# Patient Record
Sex: MALE | Race: WHITE | NOT HISPANIC OR LATINO | Employment: FULL TIME | URBAN - METROPOLITAN AREA
[De-identification: names, ages, dates, MRNs, and addresses within clinical notes are randomized per-mention and may not be internally consistent; named-entity substitution may affect disease eponyms.]

---

## 2017-07-18 ENCOUNTER — ANESTHESIA EVENT (OUTPATIENT)
Dept: GASTROENTEROLOGY | Facility: HOSPITAL | Age: 57
End: 2017-07-18
Payer: COMMERCIAL

## 2017-07-19 ENCOUNTER — HOSPITAL ENCOUNTER (OUTPATIENT)
Facility: HOSPITAL | Age: 57
Setting detail: OUTPATIENT SURGERY
Discharge: HOME | End: 2017-07-19
Attending: COLON & RECTAL SURGERY | Admitting: COLON & RECTAL SURGERY
Payer: COMMERCIAL

## 2017-07-19 ENCOUNTER — ANESTHESIA (OUTPATIENT)
Dept: GASTROENTEROLOGY | Facility: HOSPITAL | Age: 57
End: 2017-07-19
Payer: COMMERCIAL

## 2017-07-19 VITALS
HEIGHT: 74 IN | HEART RATE: 64 BPM | WEIGHT: 210 LBS | OXYGEN SATURATION: 99 % | BODY MASS INDEX: 26.95 KG/M2 | SYSTOLIC BLOOD PRESSURE: 110 MMHG | TEMPERATURE: 97.5 F | DIASTOLIC BLOOD PRESSURE: 83 MMHG | RESPIRATION RATE: 20 BRPM

## 2017-07-19 RX ORDER — PROPOFOL 10 MG/ML
INJECTION, EMULSION INTRAVENOUS AS NEEDED
Status: DISCONTINUED | OUTPATIENT
Start: 2017-07-19 | End: 2017-07-19 | Stop reason: SURG

## 2017-07-19 RX ORDER — SODIUM CHLORIDE 9 MG/ML
125 INJECTION, SOLUTION INTRAVENOUS CONTINUOUS
Status: DISCONTINUED | OUTPATIENT
Start: 2017-07-19 | End: 2017-07-19 | Stop reason: HOSPADM

## 2017-07-19 RX ORDER — SODIUM CHLORIDE, SODIUM LACTATE, POTASSIUM CHLORIDE, CALCIUM CHLORIDE 600; 310; 30; 20 MG/100ML; MG/100ML; MG/100ML; MG/100ML
INJECTION, SOLUTION INTRAVENOUS CONTINUOUS PRN
Status: DISCONTINUED | OUTPATIENT
Start: 2017-07-19 | End: 2017-07-19 | Stop reason: SURG

## 2017-07-19 RX ORDER — OMEGA-3/DHA/EPA/FISH OIL 60 MG-90MG
CAPSULE ORAL DAILY
COMMUNITY
End: 2020-03-17 | Stop reason: SDUPTHER

## 2017-07-19 RX ORDER — DIPHENOXYLATE HYDROCHLORIDE AND ATROPINE SULFATE 2.5; .025 MG/1; MG/1
1 TABLET ORAL DAILY
COMMUNITY
End: 2020-03-17 | Stop reason: SDUPTHER

## 2017-07-19 RX ADMIN — PROPOFOL 100 MG: 10 INJECTION, EMULSION INTRAVENOUS at 07:53

## 2017-07-19 RX ADMIN — SODIUM CHLORIDE 125 ML/HR: 0.9 INJECTION, SOLUTION INTRAVENOUS at 07:12

## 2017-07-19 RX ADMIN — SODIUM CHLORIDE, SODIUM LACTATE, POTASSIUM CHLORIDE, AND CALCIUM CHLORIDE: .6; .31; .03; .02 INJECTION, SOLUTION INTRAVENOUS at 07:42

## 2017-07-19 RX ADMIN — PROPOFOL 50 MG: 10 INJECTION, EMULSION INTRAVENOUS at 08:03

## 2017-07-19 RX ADMIN — PROPOFOL 50 MG: 10 INJECTION, EMULSION INTRAVENOUS at 07:59

## 2017-07-19 RX ADMIN — PROPOFOL 50 MG: 10 INJECTION, EMULSION INTRAVENOUS at 07:57

## 2017-07-19 RX ADMIN — PROPOFOL 50 MG: 10 INJECTION, EMULSION INTRAVENOUS at 07:55

## 2018-01-17 NOTE — RESULT NOTES
Verified Results  (1) CBC/PLT/DIFF [id number] 91:96BM Micha Small     Test Name Result Flag Reference   WHITE BLOOD CELL COUNT 4 7 Thousand/uL  3 8-10 8   RED BLOOD CELL COUNT 4 78 Million/uL  4 20-5 80   HEMOGLOBIN 14 2 g/dL  13 2-17 1   HEMATOCRIT 43 1 %  38 5-50 0   MCV 90 2 fL  80 0-100 0   MCH 29 6 pg  27 0-33 0   EOSINOPHILS 3 6 %     BASOPHILS 0 4 %     ABSOLUTE MONOCYTES 324 cells/uL  200-950   ABSOLUTE EOSINOPHILS 169 cells/uL     ABSOLUTE BASOPHILS 19 cells/uL  0-200   NEUTROPHILS 63 3 %     LYMPHOCYTES 25 8 %     MONOCYTES 6 9 %     MCHC 32 9 g/dL  32 0-36 0   RDW 13 7 %  11 0-15 0   PLATELET COUNT 476 Thousand/uL  140-400   MPV 8 1 fL  7 5-11 5   ABSOLUTE NEUTROPHILS 2975 cells/uL  4813-1392   ABSOLUTE LYMPHOCYTES 1213 cells/uL  850-3900     (1) COMPREHENSIVE METABOLIC PANEL [id number] 39:66DD Saul Li     Test Name Result Flag Reference   GLUCOSE 83 mg/dL  65-99   Fasting reference interval   UREA NITROGEN (BUN) 16 mg/dL  7-25   CREATININE 1 33 mg/dL  0 70-1 33   For patients >52years of age, the reference limit  for Creatinine is approximately 13% higher for people  identified as -American  eGFR NON-AFR   AMERICAN 59 mL/min/1 73m2 L > OR = 60   eGFR AFRICAN AMERICAN 68 mL/min/1 73m2  > OR = 60   BUN/CREATININE RATIO   8-00   NOT APPLICABLE (calc)   ALT 23 U/L  9-46   ALBUMIN 4 3 g/dL  3 6-5 1   GLOBULIN 2 6 g/dL (calc)  1 9-3 7   ALBUMIN/GLOBULIN RATIO 1 7 (calc)  1 0-2 5   BILIRUBIN, TOTAL 1 3 mg/dL H 0 2-1 2   ALKALINE PHOSPHATASE 54 U/L     AST 41 U/L H 10-35   SODIUM 136 mmol/L  135-146   POTASSIUM 4 1 mmol/L  3 5-5 3   CHLORIDE 101 mmol/L     CARBON DIOXIDE 26 mmol/L  20-31   CALCIUM 9 3 mg/dL  8 6-10 3   PROTEIN, TOTAL 6 9 g/dL  6 1-8 1     (1) LIPID PANEL, FASTING [id number] 64:89HC Micha Borden     Test Name Result Flag Reference   CHOLESTEROL, TOTAL 204 mg/dL H 125-200   HDL CHOLESTEROL 68 mg/dL  > OR = 40   TRIGLICERIDES 42 mg/dL  <895   LDL-CHOLESTEROL 128 mg/dL (calc)  <130   Desirable range <100 mg/dL for patients with CHD or  diabetes and <70 mg/dL for diabetic patients with  known heart disease  CHOL/HDLC RATIO 3 0 (calc)  < OR = 5 0   NON HDL CHOLESTEROL 136 mg/dL (calc)     Target for non-HDL cholesterol is 30 mg/dL higher than   LDL cholesterol target       (Q) TSH, 3RD GENERATION [id number] 94:64JV Micha Borden   REPORT COMMENT:  FASTING:YES     Test Name Result Flag Reference   TSH 2 72 mIU/L  0 40-4 50

## 2018-01-29 ENCOUNTER — OFFICE VISIT (OUTPATIENT)
Dept: FAMILY MEDICINE CLINIC | Facility: CLINIC | Age: 58
End: 2018-01-29
Payer: COMMERCIAL

## 2018-01-29 VITALS
HEIGHT: 74 IN | TEMPERATURE: 96.9 F | DIASTOLIC BLOOD PRESSURE: 92 MMHG | HEART RATE: 68 BPM | WEIGHT: 215.4 LBS | SYSTOLIC BLOOD PRESSURE: 132 MMHG | RESPIRATION RATE: 16 BRPM | BODY MASS INDEX: 27.64 KG/M2

## 2018-01-29 DIAGNOSIS — K21.9 GASTROESOPHAGEAL REFLUX DISEASE WITHOUT ESOPHAGITIS: ICD-10-CM

## 2018-01-29 DIAGNOSIS — K21.9 GASTROESOPHAGEAL REFLUX DISEASE, ESOPHAGITIS PRESENCE NOT SPECIFIED: Primary | ICD-10-CM

## 2018-01-29 PROCEDURE — 99213 OFFICE O/P EST LOW 20 MIN: CPT | Performed by: FAMILY MEDICINE

## 2018-01-29 RX ORDER — PANTOPRAZOLE SODIUM 40 MG/1
40 TABLET, DELAYED RELEASE ORAL DAILY
Qty: 30 TABLET | Refills: 0 | Status: SHIPPED | OUTPATIENT
Start: 2018-01-29 | End: 2018-01-31

## 2018-01-29 RX ORDER — FLUTICASONE PROPIONATE 50 MCG
2 SPRAY, SUSPENSION (ML) NASAL DAILY
COMMUNITY
Start: 2011-09-22 | End: 2020-03-17 | Stop reason: SDUPTHER

## 2018-01-29 NOTE — ASSESSMENT & PLAN NOTE
Esophageal reflux  Patient was prescribed pantoprazole 40 mg to use once daily  He will avoid any spicy foods or fast foods  The patient was informed that if symptoms change in any way we will consider ordering CT scan of his abdomen for further evaluation    If symptoms worsened significantly today he was instructed to go to nearest emergency room for further evaluation

## 2018-01-29 NOTE — PROGRESS NOTES
FAMILY PRACTICE OFFICE VISIT       NAME: Corrinne Grip  AGE: 61 y o  SEX: male       : 1960        MRN: [id number]    DATE: 2018  TIME: 5:32 PM    Assessment and Plan     Problem List Items Addressed This Visit     Gastroesophageal reflux disease without esophagitis     Esophageal reflux  Patient was prescribed pantoprazole 40 mg to use once daily  He will avoid any spicy foods or fast foods  The patient was informed that if symptoms change in any way we will consider ordering CT scan of his abdomen for further evaluation  If symptoms worsened significantly today he was instructed to go to nearest emergency room for further evaluation         Relevant Medications    pantoprazole (PROTONIX) 40 mg tablet      Other Visit Diagnoses     Gastroesophageal reflux disease, esophagitis presence not specified    -  Primary    Relevant Medications    pantoprazole (PROTONIX) 40 mg tablet            There are no Patient Instructions on file for this visit  Chief Complaint     Chief Complaint   Patient presents with    Abdominal Pain     Patient is here c/o upper abdominal pain and nausea x's 1 day  History of Present Illness     Patient describes vague abdominal symptoms bloating  He denies any constipation nausea or vomiting  He did have some pizza and pulled pork for dinner last night  He denies any recent weight changes  Did try over-the-counter and acid tablets without relief in symptoms  Did take 1 dose of over-the-counter Zantac      Abdominal Pain         Review of Systems   Review of Systems   Constitutional: Negative  Respiratory: Negative  Cardiovascular: Negative  Gastrointestinal:        As per HPI   Genitourinary: Negative  Skin: Negative          Active Problem List     Patient Active Problem List   Diagnosis    Attention deficit disorder    Gastroesophageal reflux disease without esophagitis       Past Medical History:  No past medical history on file     Past Surgical History:  Past Surgical History:   Procedure Laterality Date    APPENDECTOMY      KNEE ARTHROSCOPY      AK COLONOSCOPY FLX DX W/COLLJ SPEC WHEN PFRMD N/A 7/19/2017    Procedure: COLONOSCOPY;  Surgeon: Oneida Leach MD;  Location: [facility]; Service: Colorectal    ROTATOR CUFF REPAIR         Family History:  No family history on file  Social History:  Social History     Social History    Marital status: /Civil Union     Spouse name: N/A    Number of children: N/A    Years of education: N/A     Occupational History    Not on file  Social History Main Topics    Smoking status: Never Smoker    Smokeless tobacco: Never Used    Alcohol use Yes    Drug use: No    Sexual activity: Not on file     Other Topics Concern    Not on file     Social History Narrative    No narrative on file       Objective     Vitals:    01/29/18 1433   BP: 132/92   Pulse: 68   Resp: 16   Temp: (!) 96 9 °F (36 1 °C)     Wt Readings from Last 3 Encounters:   01/29/18 97 7 kg (215 lb 6 4 oz)   07/19/17 95 3 kg (210 lb)   12/08/16 98 5 kg (217 lb 4 oz)       Physical Exam   Constitutional: He appears well-developed and well-nourished  Cardiovascular: Normal rate, regular rhythm and normal heart sounds  Pulmonary/Chest: Effort normal and breath sounds normal    Abdominal: Soft  Bowel sounds are normal  He exhibits no distension and no mass  There is no tenderness  There is no rebound and no guarding  Skin: Skin is warm and dry  No rash noted         Pertinent Laboratory/Diagnostic Studies:  No results found for: GLUCOSE, BUN, CREATININE, CALCIUM, NA, K, CO2, CL  No results found for: ALT, AST, GGT, ALKPHOS, BILITOT    No results found for: WBC, HGB, HCT, MCV, PLT    No results found for: TSH    No results found for: CHOL  No results found for: TRIG  No results found for: HDL  No results found for: LDLCALC  No results found for: HGBA1C    Results for orders placed or performed in visit on 12/08/16   POCT urinalysis dipstick (Historical)   Result Value Ref Range    Color, UA Yellow     Clarity, UA Transparent     Leukocytes, UA neg     Nitrite, UA neg     Blood, UA neg     Bilirubin, UA neg     Urobilinogen, UA neg     Protein, UA 15     pH, UA 5 0     Specific [place], UA 1 015     Ketones, UA neg     Glucose neggg        No orders of the defined types were placed in this encounter  ALLERGIES:  No Known Allergies    Current Medications     Current Outpatient Prescriptions   Medication Sig Dispense Refill    fluticasone (FLONASE) 50 mcg/act nasal spray 2 sprays into each nostril daily      multivitamin (THERAGRAN) TABS Take 1 tablet by mouth daily      Omega-3 Fatty Acids (FISH OIL) 500 MG CAPS Take by mouth daily      co-enzyme Q-10 30 MG capsule Take 30 mg by mouth 3 (three) times a day      pantoprazole (PROTONIX) 40 mg tablet Take 1 tablet (40 mg total) by mouth daily 30 tablet 0     No current facility-administered medications for this visit  Health Maintenance   There are no preventive care reminders to display for this patient    Immunization History   Administered Date(s) Administered    Influenza TIV (IM) 66/28/0107       Arsen Wray MD

## 2018-01-31 ENCOUNTER — APPOINTMENT (OUTPATIENT)
Dept: RADIOLOGY | Facility: HOSPITAL | Age: 58
End: 2018-01-31
Payer: COMMERCIAL

## 2018-01-31 ENCOUNTER — HOSPITAL ENCOUNTER (OUTPATIENT)
Facility: HOSPITAL | Age: 58
Setting detail: OBSERVATION
Discharge: HOME | End: 2018-02-01
Attending: EMERGENCY MEDICINE | Admitting: SURGERY
Payer: COMMERCIAL

## 2018-01-31 ENCOUNTER — ANESTHESIA (OUTPATIENT)
Dept: PERIOP | Facility: HOSPITAL | Age: 58
End: 2018-01-31
Payer: COMMERCIAL

## 2018-01-31 ENCOUNTER — ANESTHESIA EVENT (OUTPATIENT)
Dept: PERIOP | Facility: HOSPITAL | Age: 58
End: 2018-01-31
Payer: COMMERCIAL

## 2018-01-31 ENCOUNTER — APPOINTMENT (EMERGENCY)
Dept: RADIOLOGY | Facility: HOSPITAL | Age: 58
End: 2018-01-31
Payer: COMMERCIAL

## 2018-01-31 DIAGNOSIS — K80.20 CHOLELITHIASIS: Primary | ICD-10-CM

## 2018-01-31 DIAGNOSIS — K81.0 ACUTE CHOLECYSTITIS: ICD-10-CM

## 2018-01-31 DIAGNOSIS — K81.9 CHOLECYSTITIS: ICD-10-CM

## 2018-01-31 LAB
ABO GROUP BLD: NORMAL
ALBUMIN SERPL BCP-MCNC: 4.3 G/DL (ref 3.5–5)
ALP SERPL-CCNC: 53 U/L (ref 46–116)
ALT SERPL W P-5'-P-CCNC: 23 U/L (ref 12–78)
ANION GAP SERPL CALCULATED.3IONS-SCNC: 6 MMOL/L (ref 4–13)
AST SERPL W P-5'-P-CCNC: 24 U/L (ref 5–45)
ATRIAL RATE: 49 BPM
BASOPHILS # BLD AUTO: 0.01 THOUSANDS/ΜL (ref 0–0.1)
BASOPHILS NFR BLD AUTO: 0 % (ref 0–1)
BILIRUB SERPL-MCNC: 1.29 MG/DL (ref 0.2–1)
BILIRUB UR QL STRIP: NEGATIVE
BLD GP AB SCN SERPL QL: NEGATIVE
BUN SERPL-MCNC: 20 MG/DL (ref 5–25)
CALCIUM SERPL-MCNC: 9.4 MG/DL (ref 8.3–10.1)
CHLORIDE SERPL-SCNC: 99 MMOL/L (ref 100–108)
CK MB SERPL-MCNC: 1.3 % (ref 0–2.5)
CK MB SERPL-MCNC: 4.3 NG/ML (ref 0–5)
CK SERPL-CCNC: 329 U/L (ref 39–308)
CLARITY UR: CLEAR
CO2 SERPL-SCNC: 30 MMOL/L (ref 21–32)
COLOR UR: YELLOW
COLOR, POC: YELLOW
CREAT SERPL-MCNC: 1.31 MG/DL (ref 0.6–1.3)
EOSINOPHIL # BLD AUTO: 0.17 THOUSAND/ΜL (ref 0–0.61)
EOSINOPHIL NFR BLD AUTO: 2 % (ref 0–6)
ERYTHROCYTE [DISTWIDTH] IN BLOOD BY AUTOMATED COUNT: 13.4 % (ref 11.6–15.1)
GFR SERPL CREATININE-BSD FRML MDRD: 60 ML/MIN/1.73SQ M
GLUCOSE SERPL-MCNC: 102 MG/DL (ref 65–140)
GLUCOSE UR STRIP-MCNC: NEGATIVE MG/DL
HCT VFR BLD AUTO: 39.8 % (ref 36.5–49.3)
HGB BLD-MCNC: 14.3 G/DL (ref 12–17)
HGB UR QL STRIP.AUTO: NEGATIVE
KETONES UR STRIP-MCNC: NEGATIVE MG/DL
LEUKOCYTE ESTERASE UR QL STRIP: NEGATIVE
LIPASE SERPL-CCNC: 164 U/L (ref 73–393)
LYMPHOCYTES # BLD AUTO: 1.04 THOUSANDS/ΜL (ref 0.6–4.47)
LYMPHOCYTES NFR BLD AUTO: 11 % (ref 14–44)
MCH RBC QN AUTO: 31.5 PG (ref 26.8–34.3)
MCHC RBC AUTO-ENTMCNC: 35.9 G/DL (ref 31.4–37.4)
MCV RBC AUTO: 88 FL (ref 82–98)
MONOCYTES # BLD AUTO: 0.69 THOUSAND/ΜL (ref 0.17–1.22)
MONOCYTES NFR BLD AUTO: 7 % (ref 4–12)
NEUTROPHILS # BLD AUTO: 7.37 THOUSANDS/ΜL (ref 1.85–7.62)
NEUTS SEG NFR BLD AUTO: 80 % (ref 43–75)
NITRITE UR QL STRIP: NEGATIVE
NRBC BLD AUTO-RTO: 0 /100 WBCS
P AXIS: 62 DEGREES
PH UR STRIP.AUTO: 6 [PH] (ref 4.5–8)
PLATELET # BLD AUTO: 159 THOUSANDS/UL (ref 149–390)
PLATELET # BLD AUTO: 163 THOUSANDS/UL (ref 149–390)
PMV BLD AUTO: 9.4 FL (ref 8.9–12.7)
PMV BLD AUTO: 9.7 FL (ref 8.9–12.7)
POTASSIUM SERPL-SCNC: 3.8 MMOL/L (ref 3.5–5.3)
PR INTERVAL: 180 MS
PROT SERPL-MCNC: 7.7 G/DL (ref 6.4–8.2)
PROT UR STRIP-MCNC: NEGATIVE MG/DL
QRS AXIS: 17 DEGREES
QRSD INTERVAL: 100 MS
QT INTERVAL: 468 MS
QTC INTERVAL: 422 MS
RBC # BLD AUTO: 4.54 MILLION/UL (ref 3.88–5.62)
RH BLD: POSITIVE
SODIUM SERPL-SCNC: 135 MMOL/L (ref 136–145)
SP GR UR STRIP.AUTO: 1.01 (ref 1–1.03)
SPECIMEN EXPIRATION DATE: NORMAL
T WAVE AXIS: 35 DEGREES
TROPONIN I SERPL-MCNC: <0.02 NG/ML
UROBILINOGEN UR QL STRIP.AUTO: 0.2 E.U./DL
VENTRICULAR RATE: 49 BPM
WBC # BLD AUTO: 9.3 THOUSAND/UL (ref 4.31–10.16)

## 2018-01-31 PROCEDURE — 74300 X-RAY BILE DUCTS/PANCREAS: CPT

## 2018-01-31 PROCEDURE — 74177 CT ABD & PELVIS W/CONTRAST: CPT

## 2018-01-31 PROCEDURE — 83690 ASSAY OF LIPASE: CPT | Performed by: EMERGENCY MEDICINE

## 2018-01-31 PROCEDURE — 99225 PR SBSQ OBSERVATION CARE/DAY 25 MINUTES: CPT | Performed by: SURGERY

## 2018-01-31 PROCEDURE — 96374 THER/PROPH/DIAG INJ IV PUSH: CPT

## 2018-01-31 PROCEDURE — 84484 ASSAY OF TROPONIN QUANT: CPT | Performed by: EMERGENCY MEDICINE

## 2018-01-31 PROCEDURE — 81003 URINALYSIS AUTO W/O SCOPE: CPT

## 2018-01-31 PROCEDURE — 85049 AUTOMATED PLATELET COUNT: CPT | Performed by: SURGERY

## 2018-01-31 PROCEDURE — 93005 ELECTROCARDIOGRAM TRACING: CPT

## 2018-01-31 PROCEDURE — 86900 BLOOD TYPING SEROLOGIC ABO: CPT | Performed by: SURGERY

## 2018-01-31 PROCEDURE — 47562 LAPAROSCOPIC CHOLECYSTECTOMY: CPT | Performed by: SURGERY

## 2018-01-31 PROCEDURE — 86850 RBC ANTIBODY SCREEN: CPT | Performed by: SURGERY

## 2018-01-31 PROCEDURE — 88304 TISSUE EXAM BY PATHOLOGIST: CPT | Performed by: PATHOLOGY

## 2018-01-31 PROCEDURE — 96361 HYDRATE IV INFUSION ADD-ON: CPT

## 2018-01-31 PROCEDURE — 81002 URINALYSIS NONAUTO W/O SCOPE: CPT | Performed by: EMERGENCY MEDICINE

## 2018-01-31 PROCEDURE — 82553 CREATINE MB FRACTION: CPT | Performed by: EMERGENCY MEDICINE

## 2018-01-31 PROCEDURE — 99285 EMERGENCY DEPT VISIT HI MDM: CPT

## 2018-01-31 PROCEDURE — 36415 COLL VENOUS BLD VENIPUNCTURE: CPT | Performed by: EMERGENCY MEDICINE

## 2018-01-31 PROCEDURE — 82550 ASSAY OF CK (CPK): CPT | Performed by: EMERGENCY MEDICINE

## 2018-01-31 PROCEDURE — 76705 ECHO EXAM OF ABDOMEN: CPT

## 2018-01-31 PROCEDURE — 88304 TISSUE EXAM BY PATHOLOGIST: CPT | Performed by: SURGERY

## 2018-01-31 PROCEDURE — 93010 ELECTROCARDIOGRAM REPORT: CPT | Performed by: INTERNAL MEDICINE

## 2018-01-31 PROCEDURE — 80053 COMPREHEN METABOLIC PANEL: CPT | Performed by: EMERGENCY MEDICINE

## 2018-01-31 PROCEDURE — 96375 TX/PRO/DX INJ NEW DRUG ADDON: CPT

## 2018-01-31 PROCEDURE — 85025 COMPLETE CBC W/AUTO DIFF WBC: CPT | Performed by: EMERGENCY MEDICINE

## 2018-01-31 PROCEDURE — 86901 BLOOD TYPING SEROLOGIC RH(D): CPT | Performed by: SURGERY

## 2018-01-31 RX ORDER — FENTANYL CITRATE 50 UG/ML
INJECTION, SOLUTION INTRAMUSCULAR; INTRAVENOUS AS NEEDED
Status: DISCONTINUED | OUTPATIENT
Start: 2018-01-31 | End: 2018-01-31 | Stop reason: SURG

## 2018-01-31 RX ORDER — OXYCODONE HYDROCHLORIDE 10 MG/1
10 TABLET ORAL EVERY 4 HOURS PRN
Status: DISCONTINUED | OUTPATIENT
Start: 2018-01-31 | End: 2018-02-01 | Stop reason: HOSPADM

## 2018-01-31 RX ORDER — MAGNESIUM HYDROXIDE 1200 MG/15ML
LIQUID ORAL AS NEEDED
Status: DISCONTINUED | OUTPATIENT
Start: 2018-01-31 | End: 2018-01-31 | Stop reason: HOSPADM

## 2018-01-31 RX ORDER — EPHEDRINE SULFATE 50 MG/ML
INJECTION, SOLUTION INTRAVENOUS AS NEEDED
Status: DISCONTINUED | OUTPATIENT
Start: 2018-01-31 | End: 2018-01-31 | Stop reason: SURG

## 2018-01-31 RX ORDER — FENTANYL CITRATE 50 UG/ML
50 INJECTION, SOLUTION INTRAMUSCULAR; INTRAVENOUS ONCE
Status: COMPLETED | OUTPATIENT
Start: 2018-01-31 | End: 2018-01-31

## 2018-01-31 RX ORDER — LIDOCAINE HYDROCHLORIDE 10 MG/ML
INJECTION, SOLUTION INFILTRATION; PERINEURAL AS NEEDED
Status: DISCONTINUED | OUTPATIENT
Start: 2018-01-31 | End: 2018-01-31 | Stop reason: SURG

## 2018-01-31 RX ORDER — SUCRALFATE ORAL 1 G/10ML
1000 SUSPENSION ORAL ONCE
Status: COMPLETED | OUTPATIENT
Start: 2018-01-31 | End: 2018-01-31

## 2018-01-31 RX ORDER — CEFAZOLIN SODIUM 1 G/3ML
INJECTION, POWDER, FOR SOLUTION INTRAMUSCULAR; INTRAVENOUS AS NEEDED
Status: DISCONTINUED | OUTPATIENT
Start: 2018-01-31 | End: 2018-01-31 | Stop reason: SURG

## 2018-01-31 RX ORDER — OXYCODONE HYDROCHLORIDE 5 MG/1
5 TABLET ORAL EVERY 4 HOURS PRN
Status: DISCONTINUED | OUTPATIENT
Start: 2018-01-31 | End: 2018-02-01 | Stop reason: HOSPADM

## 2018-01-31 RX ORDER — PROPOFOL 10 MG/ML
INJECTION, EMULSION INTRAVENOUS AS NEEDED
Status: DISCONTINUED | OUTPATIENT
Start: 2018-01-31 | End: 2018-01-31 | Stop reason: SURG

## 2018-01-31 RX ORDER — SODIUM CHLORIDE, SODIUM LACTATE, POTASSIUM CHLORIDE, CALCIUM CHLORIDE 600; 310; 30; 20 MG/100ML; MG/100ML; MG/100ML; MG/100ML
100 INJECTION, SOLUTION INTRAVENOUS CONTINUOUS
Status: DISCONTINUED | OUTPATIENT
Start: 2018-01-31 | End: 2018-02-01 | Stop reason: HOSPADM

## 2018-01-31 RX ORDER — HYDROMORPHONE HYDROCHLORIDE 2 MG/ML
INJECTION, SOLUTION INTRAMUSCULAR; INTRAVENOUS; SUBCUTANEOUS AS NEEDED
Status: DISCONTINUED | OUTPATIENT
Start: 2018-01-31 | End: 2018-01-31 | Stop reason: SURG

## 2018-01-31 RX ORDER — ACETAMINOPHEN 325 MG/1
650 TABLET ORAL EVERY 6 HOURS PRN
Status: DISCONTINUED | OUTPATIENT
Start: 2018-01-31 | End: 2018-02-01 | Stop reason: HOSPADM

## 2018-01-31 RX ORDER — HEPARIN SODIUM 5000 [USP'U]/ML
5000 INJECTION, SOLUTION INTRAVENOUS; SUBCUTANEOUS EVERY 8 HOURS SCHEDULED
Status: DISCONTINUED | OUTPATIENT
Start: 2018-01-31 | End: 2018-02-01 | Stop reason: HOSPADM

## 2018-01-31 RX ORDER — GLYCOPYRROLATE 0.2 MG/ML
INJECTION INTRAMUSCULAR; INTRAVENOUS AS NEEDED
Status: DISCONTINUED | OUTPATIENT
Start: 2018-01-31 | End: 2018-01-31 | Stop reason: SURG

## 2018-01-31 RX ORDER — SODIUM CHLORIDE 9 MG/ML
125 INJECTION, SOLUTION INTRAVENOUS CONTINUOUS
Status: DISCONTINUED | OUTPATIENT
Start: 2018-01-31 | End: 2018-02-01 | Stop reason: HOSPADM

## 2018-01-31 RX ORDER — PROMETHAZINE HYDROCHLORIDE 25 MG/ML
25 INJECTION, SOLUTION INTRAMUSCULAR; INTRAVENOUS ONCE AS NEEDED
Status: DISCONTINUED | OUTPATIENT
Start: 2018-01-31 | End: 2018-01-31 | Stop reason: HOSPADM

## 2018-01-31 RX ORDER — FENTANYL CITRATE/PF 50 MCG/ML
50 SYRINGE (ML) INJECTION
Status: DISCONTINUED | OUTPATIENT
Start: 2018-01-31 | End: 2018-01-31 | Stop reason: HOSPADM

## 2018-01-31 RX ORDER — MIDAZOLAM HYDROCHLORIDE 1 MG/ML
INJECTION INTRAMUSCULAR; INTRAVENOUS
Status: COMPLETED
Start: 2018-01-31 | End: 2018-01-31

## 2018-01-31 RX ORDER — ROCURONIUM BROMIDE 10 MG/ML
INJECTION, SOLUTION INTRAVENOUS AS NEEDED
Status: DISCONTINUED | OUTPATIENT
Start: 2018-01-31 | End: 2018-01-31 | Stop reason: SURG

## 2018-01-31 RX ORDER — MAGNESIUM HYDROXIDE/ALUMINUM HYDROXICE/SIMETHICONE 120; 1200; 1200 MG/30ML; MG/30ML; MG/30ML
30 SUSPENSION ORAL ONCE
Status: COMPLETED | OUTPATIENT
Start: 2018-01-31 | End: 2018-01-31

## 2018-01-31 RX ORDER — MIDAZOLAM HYDROCHLORIDE 1 MG/ML
INJECTION INTRAMUSCULAR; INTRAVENOUS AS NEEDED
Status: DISCONTINUED | OUTPATIENT
Start: 2018-01-31 | End: 2018-01-31 | Stop reason: SURG

## 2018-01-31 RX ORDER — ONDANSETRON 2 MG/ML
INJECTION INTRAMUSCULAR; INTRAVENOUS AS NEEDED
Status: DISCONTINUED | OUTPATIENT
Start: 2018-01-31 | End: 2018-01-31 | Stop reason: SURG

## 2018-01-31 RX ADMIN — SODIUM CHLORIDE 1000 ML: 0.9 INJECTION, SOLUTION INTRAVENOUS at 02:05

## 2018-01-31 RX ADMIN — METRONIDAZOLE 500 MG: 500 INJECTION, SOLUTION INTRAVENOUS at 06:06

## 2018-01-31 RX ADMIN — PROPOFOL 200 MG: 10 INJECTION, EMULSION INTRAVENOUS at 14:58

## 2018-01-31 RX ADMIN — HYDROMORPHONE HYDROCHLORIDE 1 MG: 1 INJECTION, SOLUTION INTRAMUSCULAR; INTRAVENOUS; SUBCUTANEOUS at 10:21

## 2018-01-31 RX ADMIN — ROCURONIUM BROMIDE 10 MG: 10 INJECTION INTRAVENOUS at 16:52

## 2018-01-31 RX ADMIN — METRONIDAZOLE 500 MG: 500 INJECTION, SOLUTION INTRAVENOUS at 15:11

## 2018-01-31 RX ADMIN — NEOSTIGMINE METHYLSULFATE 5 MG: 1 INJECTION, SOLUTION INTRAMUSCULAR; INTRAVENOUS; SUBCUTANEOUS at 17:41

## 2018-01-31 RX ADMIN — ROCURONIUM BROMIDE 20 MG: 10 INJECTION INTRAVENOUS at 15:58

## 2018-01-31 RX ADMIN — EPHEDRINE SULFATE 10 MG: 50 INJECTION, SOLUTION INTRAMUSCULAR; INTRAVENOUS; SUBCUTANEOUS at 15:15

## 2018-01-31 RX ADMIN — HYDROMORPHONE HYDROCHLORIDE 0.5 MG: 1 INJECTION, SOLUTION INTRAMUSCULAR; INTRAVENOUS; SUBCUTANEOUS at 06:05

## 2018-01-31 RX ADMIN — OXYCODONE HYDROCHLORIDE 10 MG: 10 TABLET ORAL at 22:45

## 2018-01-31 RX ADMIN — HYDROMORPHONE HYDROCHLORIDE 0.5 MG: 2 INJECTION, SOLUTION INTRAMUSCULAR; INTRAVENOUS; SUBCUTANEOUS at 17:54

## 2018-01-31 RX ADMIN — LIDOCAINE HYDROCHLORIDE 50 MG: 10 INJECTION, SOLUTION INFILTRATION; PERINEURAL at 14:58

## 2018-01-31 RX ADMIN — SODIUM CHLORIDE 1000 ML: 0.9 INJECTION, SOLUTION INTRAVENOUS at 04:51

## 2018-01-31 RX ADMIN — EPHEDRINE SULFATE 5 MG: 50 INJECTION, SOLUTION INTRAMUSCULAR; INTRAVENOUS; SUBCUTANEOUS at 15:09

## 2018-01-31 RX ADMIN — ROCURONIUM BROMIDE 40 MG: 10 INJECTION INTRAVENOUS at 14:58

## 2018-01-31 RX ADMIN — CEFAZOLIN 2000 MG: 1 INJECTION, POWDER, FOR SOLUTION INTRAVENOUS at 15:15

## 2018-01-31 RX ADMIN — ROCURONIUM BROMIDE 10 MG: 10 INJECTION INTRAVENOUS at 15:07

## 2018-01-31 RX ADMIN — HYDROMORPHONE HYDROCHLORIDE 0.5 MG: 1 INJECTION, SOLUTION INTRAMUSCULAR; INTRAVENOUS; SUBCUTANEOUS at 07:44

## 2018-01-31 RX ADMIN — SODIUM CHLORIDE 125 ML/HR: 0.9 INJECTION, SOLUTION INTRAVENOUS at 18:38

## 2018-01-31 RX ADMIN — LIDOCAINE HYDROCHLORIDE 15 ML: 20 SOLUTION ORAL; TOPICAL at 01:28

## 2018-01-31 RX ADMIN — GLYCOPYRROLATE 0.6 MG: 0.2 INJECTION, SOLUTION INTRAMUSCULAR; INTRAVENOUS at 17:41

## 2018-01-31 RX ADMIN — EPHEDRINE SULFATE 10 MG: 50 INJECTION, SOLUTION INTRAMUSCULAR; INTRAVENOUS; SUBCUTANEOUS at 15:12

## 2018-01-31 RX ADMIN — MIDAZOLAM HYDROCHLORIDE 2 MG: 1 INJECTION, SOLUTION INTRAMUSCULAR; INTRAVENOUS at 14:48

## 2018-01-31 RX ADMIN — ONDANSETRON 4 MG: 2 INJECTION INTRAMUSCULAR; INTRAVENOUS at 17:11

## 2018-01-31 RX ADMIN — HYDROMORPHONE HYDROCHLORIDE 0.5 MG: 1 INJECTION, SOLUTION INTRAMUSCULAR; INTRAVENOUS; SUBCUTANEOUS at 04:13

## 2018-01-31 RX ADMIN — HYDROMORPHONE HYDROCHLORIDE 1 MG: 1 INJECTION, SOLUTION INTRAMUSCULAR; INTRAVENOUS; SUBCUTANEOUS at 20:00

## 2018-01-31 RX ADMIN — CEFAZOLIN SODIUM 2000 MG: 2 SOLUTION INTRAVENOUS at 07:36

## 2018-01-31 RX ADMIN — SODIUM CHLORIDE 125 ML/HR: 0.9 INJECTION, SOLUTION INTRAVENOUS at 07:36

## 2018-01-31 RX ADMIN — HYDROMORPHONE HYDROCHLORIDE 0.5 MG: 2 INJECTION, SOLUTION INTRAMUSCULAR; INTRAVENOUS; SUBCUTANEOUS at 17:48

## 2018-01-31 RX ADMIN — IOHEXOL 100 ML: 350 INJECTION, SOLUTION INTRAVENOUS at 03:40

## 2018-01-31 RX ADMIN — HEPARIN SODIUM 5000 UNITS: 5000 INJECTION, SOLUTION INTRAVENOUS; SUBCUTANEOUS at 21:07

## 2018-01-31 RX ADMIN — DEXAMETHASONE SODIUM PHOSPHATE 10 MG: 10 INJECTION INTRAMUSCULAR; INTRAVENOUS at 15:16

## 2018-01-31 RX ADMIN — FENTANYL CITRATE 50 MCG: 50 INJECTION, SOLUTION INTRAMUSCULAR; INTRAVENOUS at 14:58

## 2018-01-31 RX ADMIN — METRONIDAZOLE 500 MG: 500 INJECTION, SOLUTION INTRAVENOUS at 14:09

## 2018-01-31 RX ADMIN — ONDANSETRON 4 MG: 2 INJECTION INTRAMUSCULAR; INTRAVENOUS at 15:14

## 2018-01-31 RX ADMIN — SUCRALFATE 1000 MG: 1 SUSPENSION ORAL at 01:28

## 2018-01-31 RX ADMIN — SODIUM CHLORIDE: 0.9 INJECTION, SOLUTION INTRAVENOUS at 15:06

## 2018-01-31 RX ADMIN — ALUMINUM HYDROXIDE, MAGNESIUM HYDROXIDE, AND SIMETHICONE 30 ML: 200; 200; 20 SUSPENSION ORAL at 01:28

## 2018-01-31 RX ADMIN — FENTANYL CITRATE 50 MCG: 50 INJECTION INTRAMUSCULAR; INTRAVENOUS at 02:05

## 2018-01-31 RX ADMIN — HYDROMORPHONE HYDROCHLORIDE 1 MG: 1 INJECTION, SOLUTION INTRAMUSCULAR; INTRAVENOUS; SUBCUTANEOUS at 12:12

## 2018-01-31 RX ADMIN — GLYCOPYRROLATE 0.1 MG: 0.2 INJECTION, SOLUTION INTRAMUSCULAR; INTRAVENOUS at 15:14

## 2018-01-31 RX ADMIN — ROCURONIUM BROMIDE 20 MG: 10 INJECTION INTRAVENOUS at 16:30

## 2018-01-31 NOTE — ED NOTES
Dr Steven King at the bedside for patient evaluation at this time       Jen Reynoso, RN  01/31/18 2804

## 2018-01-31 NOTE — ANESTHESIA PREPROCEDURE EVALUATION
Review of Systems/Medical History  Patient summary reviewed  Chart reviewed  No history of anesthetic complications     Cardiovascular  EKG reviewed, Exercise tolerance: good,     Pulmonary       GI/Hepatic    GERD well controlled,             Endo/Other     GYN       Hematology   Musculoskeletal       Neurology   Psychology           Physical Exam    Airway    Mallampati score: II  TM Distance: >3 FB  Neck ROM: full     Dental   No notable dental hx     Cardiovascular      Pulmonary      Other Findings        Anesthesia Plan  ASA Score- 2 Emergent    Anesthesia Type- general with ASA Monitors  Additional Monitors:   Airway Plan: ETT  Plan Factors-    Induction- intravenous  Postoperative Plan- Plan for postoperative opioid use  Informed Consent- Anesthetic plan and risks discussed with patient  I personally reviewed this patient with the CRNA  Discussed and agreed on the Anesthesia Plan with the CRNA  Dara Soulier

## 2018-01-31 NOTE — ED PROVIDER NOTES
History  Chief Complaint   Patient presents with    Abdominal Pain     pt states abdominal pain since   denies NVD or trouble urinating  HPI   60 yo male presenting for evaluation of abdominal pain  Started Monday morning, woke patient up  States it was in the upper abdomen, feels like a muscle cramp  Has had this type of pain when he had appendicitis  He took some TUMS, but it didn't help, tried rolaid and zantac, which didn't help  Took Aleve and it helped, fell back to sleep  Woke up Monday, still had pain so went to PCP, was given prescription for PPI, took the medications today  Today was doing ok, had lunch (vegetable omelett with crowley) and the pain started  Patient then had dinner, chicken and BBQ sauce, and the pain got worse  Patient states that his rib cage feels cramped up  Patient had a very intense crossfit work out today, and states he has worsened pain  Pain is better when he moves around, when he lifts his arms above his head, the pain is better  Patient denies any nausea, no vomiting, no diarrhea  Denies any urinary symptoms, no dysuria  Denies fevers and chills  No recent travel, no sick contacts, no recent illness  PMH: none, takes dietary supplements, takes aleve occasionally  PSH: Appendix, shoulder and knee  FH: no FH of biliary disease      Prior to Admission Medications   Prescriptions Last Dose Informant Patient Reported? Taking? Omega-3 Fatty Acids (FISH OIL) 500 MG CAPS 2018 at Unknown time  Yes Yes   Sig: Take by mouth daily   co-enzyme Q-10 30 MG capsule 2018 at Unknown time  Yes Yes   Sig: Take 30 mg by mouth 3 (three) times a day   fluticasone (FLONASE) 50 mcg/act nasal spray 2018 at Unknown time  Yes Yes   Si sprays into each nostril daily   multivitamin (THERAGRAN) TABS 2018 at Unknown time  Yes Yes   Sig: Take 1 tablet by mouth daily      Facility-Administered Medications: None       History reviewed   No pertinent past medical history  Past Surgical History:   Procedure Laterality Date    APPENDECTOMY      KNEE ARTHROSCOPY      [place] COLONOSCOPY FLX DX W/COLLJ SPEC WHEN PFRMD N/A 7/19/2017    Procedure: COLONOSCOPY;  Surgeon: Lewis Cisneros MD;  Location[facility]; Service: Colorectal    ROTATOR CUFF REPAIR         No family history on file  I have reviewed and agree with the history as documented  Social History   Substance Use Topics    Smoking status: Never Smoker    Smokeless tobacco: Never Used    Alcohol use Yes      Comment: socially        Review of Systems    Constitutional: Negative for appetite change, chills and fever  HENT: Negative for congestion, rhinorrhea and sore throat  Eyes: Negative for photophobia, pain and visual disturbance  Respiratory: Negative for cough, chest tightness and shortness of breath  Cardiovascular: Negative for chest pain, palpitations and leg swelling  Gastrointestinal: Positive for abdominal pain, negative for diarrhea, nausea and vomiting  Genitourinary: Negative for dysuria, flank pain and hematuria  Musculoskeletal: Negative for back pain, neck pain and neck stiffness  Skin: Negative for color change, rash and wound  Neurological: Negative for dizziness, numbness and headaches  All other systems reviewed and are negative      Physical Exam  ED Triage Vitals [01/31/18 0042]   Temperature Pulse Respirations Blood Pressure SpO2   98 9 °F (37 2 °C) 60 20 145/79 100 %      Temp Source Heart Rate Source Patient Position - Orthostatic VS BP Location FiO2 (%)   Tympanic Monitor Sitting Left arm --      Pain Score       8           Orthostatic Vital Signs  Vitals:    01/31/18 0445 01/31/18 0515 01/31/18 0545 01/31/18 0605   BP: 124/69 139/62 134/67 123/60   Pulse: 58 60 60 (!) 54   Patient Position - Orthostatic VS: Lying Lying Lying Lying       Physical Exam  /60 (BP Location: Right arm)   Pulse (!) 54   Temp 98 9 °F (37 2 °C) (Tympanic)   Resp 18   Wt 97 5 kg (215 lb)   SpO2 99%   BMI 27 60 kg/m²     General Appearance:  Alert, cooperative, no distress   Head:  Normocephalic, without obvious abnormality, atraumatic   Eyes:  PERRL, conjunctiva/corneas clear, EOM's intact   Ears:  Normal TM's and external ear canals, both ears   Nose: Nares normal, septum midline, mucosa normal, no drainage or sinus tenderness   Throat: Lips, mucosa, and tongue normal; teeth and gums normal   Neck: Supple, symmetrical, trachea midline, no adenopathy   Back:   Symmetric, no curvature, ROM normal, no CVA tenderness   Lungs:   Clear to auscultation bilaterally, respirations unlabored   Chest Wall:  No tenderness or deformity   Heart:  Regular rate and rhythm, S1, S2 normal, no murmur, rub or gallop   Abdomen:   Soft, nondistended, mildly tender in the epigastrium and suprapubic area, no rebound or guarding, bowel sounds active all four quadrants  ,  bedside ultrasound shows mildly distended gallbladder, while appears mildly thick at 0 5 cm  Did not see stones but possible sludge             Extremities: Extremities normal, atraumatic, no cyanosis or edema   Pulses: 2+ and symmetric   Skin: Skin color, texture, turgor normal, no rashes or lesions       Neurologic:      Psychiatric:  Moves all extremities, sensation and strength in tact in all extremities    Normal mood and affect         ED Medications  Medications   ceFAZolin (ANCEF) IVPB (premix) 2,000 mg (not administered)   metroNIDAZOLE (FLAGYL) IVPB (premix) 500 mg (500 mg Intravenous New Bag 1/31/18 0606)   sodium chloride 0 9 % infusion (not administered)   HYDROmorphone (DILAUDID) injection 0 5 mg (0 5 mg Intravenous Given 1/31/18 0605)   aluminum-magnesium hydroxide-simethicone (MYLANTA) 200-200-20 mg/5 mL oral suspension 30 mL (30 mL Oral Given 1/31/18 0128)   sucralfate (CARAFATE) oral suspension 1,000 mg (1,000 mg Oral Given 1/31/18 0128)   lidocaine viscous (XYLOCAINE) 2 % mucosal solution 15 mL (15 mL Swish & Spit Given 1/31/18 0128)   sodium chloride 0 9 % bolus 1,000 mL (0 mL Intravenous Stopped 1/31/18 0305)   fentanyl citrate (PF) 100 MCG/2ML 50 mcg (50 mcg Intravenous Given 1/31/18 0205)   iohexol (OMNIPAQUE) 350 MG/ML injection (SINGLE-DOSE) 100 mL (100 mL Intravenous Given 1/31/18 0340)   HYDROmorphone (DILAUDID) injection 0 5 mg (0 5 mg Intravenous Given 1/31/18 0413)   sodium chloride 0 9 % bolus 1,000 mL (0 mL Intravenous Stopped 1/31/18 0551)       Diagnostic Studies  Results Reviewed     Procedure Component Value Units Date/Time    CKMB [[id number]]  (Normal) Collected:  01/31/18 0130    Lab Status:  Final result Specimen:  Blood from Arm, Left Updated:  01/31/18 0434     CK-MB Index 1 3 %      CK-MB FRACTION 4 3 ng/mL     CK (with reflex to MB) [[id number]]  (Abnormal) Collected:  01/31/18 0130    Lab Status:  Final result Specimen:  Blood from Arm, Left Updated:  01/31/18 0415     Total  (H) U/L     POCT urinalysis dipstick [[id number]]  (Normal) Resulted:  01/31/18 0201    Lab Status:  Final result Specimen:  Urine Updated:  01/31/18 0201     Color, UA yellow    Comprehensive metabolic panel [[id number]]  (Abnormal) Collected:  01/31/18 0130    Lab Status:  Final result Specimen:  Blood from Arm, Left Updated:  01/31/18 0153     Sodium 135 (L) mmol/L      Potassium 3 8 mmol/L      Chloride 99 (L) mmol/L      CO2 30 mmol/L      Anion Gap 6 mmol/L      BUN 20 mg/dL      Creatinine 1 31 (H) mg/dL      Glucose 102 mg/dL      Calcium 9 4 mg/dL      AST 24 U/L      ALT 23 U/L      Alkaline Phosphatase 53 U/L      Total Protein 7 7 g/dL      Albumin 4 3 g/dL      Total Bilirubin 1 29 (H) mg/dL      eGFR 60 ml/min/1 73sq m     Narrative:         National Kidney Disease Education Program recommendations are as follows:  GFR calculation is accurate only with a steady state creatinine  Chronic Kidney disease less than 60 ml/min/1 73 sq  meters  Kidney failure less than 15 ml/min/1 73 sq  meters      Lipase [[id number]]  (Normal) Collected:  01/31/18 0130    Lab Status:  Final result Specimen:  Blood from Arm, Left Updated:  01/31/18 0153     Lipase 164 u/L     Troponin I [[id number]]  (Normal) Collected:  01/31/18 0130    Lab Status:  Final result Specimen:  Blood from Arm, Left Updated:  01/31/18 0153     Troponin I <0 02 ng/mL     Narrative:         [employer] Chemistry analyzer 99% cutoff is > 0 04 ng/mL in network labs    o cTnI 99% cutoff is useful only when applied to patients in the clinical setting of myocardial ischemia  o cTnI 99% cutoff should be interpreted in the context of clinical history, ECG findings and possibly cardiac imaging to establish correct diagnosis  o cTnI 99% cutoff may be suggestive but clearly not indicative of a coronary event without the clinical setting of myocardial ischemia      ED Urine Macroscopic [[id number]]  (Normal) Collected:  01/31/18 0148    Lab Status:  Final result Specimen:  Urine Updated:  01/31/18 0144     Color, UA Yellow     Clarity, UA Clear     pH, UA 6 0     Leukocytes, UA Negative     Nitrite, UA Negative     Protein, UA Negative mg/dl      Glucose, UA Negative mg/dl      Ketones, UA Negative mg/dl      Urobilinogen, UA 0 2 E U /dl      Bilirubin, UA Negative     Blood, UA Negative     Specific Gravity, UA 1 015    Narrative:       CLINITEK RESULT    CBC and differential [[id number]]  (Abnormal) Collected:  01/31/18 0130    Lab Status:  Final result Specimen:  Blood from Arm, Left Updated:  01/31/18 0136     WBC 9 30 Thousand/uL      RBC 4 54 Million/uL      Hemoglobin 14 3 g/dL      Hematocrit 39 8 %      MCV 88 fL      MCH 31 5 pg      MCHC 35 9 g/dL      RDW 13 4 %      MPV 9 4 fL      Platelets 780 Thousands/uL      nRBC 0 /100 WBCs      Neutrophils Relative 80 (H) %      Lymphocytes Relative 11 (L) %      Monocytes Relative 7 %      Eosinophils Relative 2 %      Basophils Relative 0 %      Neutrophils Absolute 7 37 Thousands/µL      Lymphocytes Absolute 1 04 Thousands/µL      Monocytes Absolute 0 69 Thousand/µL      Eosinophils Absolute 0 17 Thousand/µL      Basophils Absolute 0 01 Thousands/µL                  CT abdomen pelvis with contrast    (Results Pending)   US gallbladder    (Results Pending)         Procedures  ECG 12 Lead Documentation  Date/Time: 1/31/2018 1:59 AM  Performed by: Sveta Guaman by: Juventino Garcia     Indications / Diagnosis:  Epigastric pain  ECG reviewed by me, the ED Provider: yes    Patient location:  ED  Previous ECG:     Previous ECG:  Compared to current    Comparison ECG info:  Bradycardic  Interpretation:     Interpretation: non-specific    Rate:     ECG rate:  49    ECG rate assessment: bradycardic    Rhythm:     Rhythm: sinus bradycardia    Ectopy:     Ectopy: none    QRS:     QRS axis:  Normal    QRS intervals:  Normal  Conduction:     Conduction: normal    ST segments:     ST segments:  Normal  T waves:     T waves: normal            Phone Consults  ED Phone Contact    ED Course  ED Course as of Jan 31 0625 Wed Jan 31, 2018   0156 Patient states that the GI cocktail did not help the pain  Patient has increased creatinine and T bili  Will get CT scan of the abdomen pelvis  [facility] with [address] [place]  surgery resident    6616 Vrads: IMPRESSION:  Cholelithiasis and findings most compatible with acute cholecystitis with gallbladder wall  hyperemia/edema/thickening and pericholecystic inflammatory changes  MDM   Patient with abdominal pain, possibly from biliary disease versus gastritis versus pancreatitis versus abdominal strain/muscle strain  Will get CBC, CMP, lipase, check a urine  Will treat with GI cocktail, reassess      CritCare Time    Disposition  Final diagnoses:   Cholelithiasis   Cholecystitis     Time reflects when diagnosis was documented in both MDM as applicable and the Disposition within this note     Time User Action Codes Description Comment    1/31/2018  5:55 AM Matt Manjarrez Add [K80 20] Cholelithiasis     1/31/2018  5:55 AM Tereso Sharif [facility] [K81 9] Cholecystitis       ED Disposition     ED Disposition Condition Comment    Admit  Case was discussed with [facility] and the patient's admission status was agreed to be Admission Status: observation status to the service of Dr Qian Maguire  Follow-up Information    None       Patient's Medications   Discharge Prescriptions    No medications on file     No discharge procedures on file  ED Provider  Attending physically available and evaluated Suzy Hernandez I managed the patient along with the ED Attending      Electronically Signed by         Kalyn Chin MD  01/31/18 6854

## 2018-01-31 NOTE — ANESTHESIA POSTPROCEDURE EVALUATION
Post-Op Assessment Note      CV Status:  Stable    Mental Status:  Alert and awake    Hydration Status:  Stable    PONV Controlled:  None    Airway Patency:  Patent    Post Op Vitals Reviewed: Yes          Staff: CRNA       Comments: Pt  to Pacu  Report given  Course uneventful  VSS througjhout   No c/o pain or PONV           /66 (01/31/18 1755)    Temp 99 1 °F (37 3 °C) (01/31/18 1755)    Pulse 77 (01/31/18 1755)   Resp 16 (01/31/18 1755)    SpO2 100 % (01/31/18 1755)

## 2018-01-31 NOTE — PROGRESS NOTES
General Surgery    Pt seen in pre-op holding by myself  OK to proceed with lap breanna with cholangiogram  Consent signed and placed in chart      Nemo Medrano MD

## 2018-01-31 NOTE — OP NOTE
OPERATIVE REPORT  PATIENT NAME: Kristan Meckel    :  1960  MRN: [id number]  Pt Location:  OR ROOM 05    SURGERY DATE: 2018    Surgeon(s) and Role:     * Julissa Plummer MD - Primary     * Jerzy Allen - Assisting    Preop Diagnosis:  Cholecystitis [K81 9]    Post-Op Diagnosis Codes:     * Cholecystitis [K81 9]    Procedure(s) (LRB):  CHOLECYSTECTOMY LAPAROSCOPIC (N/A)  CHOLANGIOGRAM (N/A)    Specimen(s):  ID Type Source Tests Collected by Time Destination   1 : gallbladder Tissue Gallbladder TISSUE EXAM Julissa Plummer MD 2018 1710        Estimated Blood Loss:   100 mL    Drains:       Anesthesia Type:   General    Operative Indications:  Cholecystitis [K81 9]    Operative Findings:  inflammed gallbladder with thick rind noted     Complications:   None    Procedure and Technique:    The patient was seen again in the Holding Room  The risks, benefits, complications, treatment options, and expected outcomes were discussed with the patient  The possibilities of reaction to  perforation of viscus, bleeding, recurrent infection, finding a normal gallbladder, the need for additional procedures, failure to diagnose a condition, the possible need to convert to an open procedure, and creating a complication requiring transfusion or operation were discussed with the patient  The patient  concurred with the proposed plan, giving informed consent  The site of surgery properly noted/marked  The patient was taken to Operating Room, identified as Kristan Meckel and the procedure verified as Laparoscopic Cholecystectomy with Intraoperative Cholangiograms  A Time Out was held and the above information confirmed  Prior to the induction of general anesthesia, antibiotic prophylaxis was administered  General endotracheal anesthesia was then administered and tolerated well  After the induction, the abdomen was prepped in the usual sterile fashion   The patient was positioned in the supine position with the left arm comfortably tucked, along with some reverse Trendelenburg  Local anesthetic agent was injected into the skin near the umbilicus and an incision made  Pneumoperitoneum was achieved using the verass needle  The midline fascia was incised and a 5  mm port under direct vision  Additional trocars were introduced under direct vision  All skin incisions were infiltrated with a local anesthetic agent before making the incision and placing the trocars  The gallbladder was identified and found to be necrotic and inflammed, the fundus grasped and retracted cephalad  Adhesions were lysed bluntly and with the electrocautery where indicated, taking care not to injure any adjacent organs or viscus  The infundibulum was grasped and retracted laterally, exposing the peritoneum overlying the triangle of Calot  This was then divided and exposed in a blunt fashion  The cystic duct was clearly identified and bluntly dissected circumferentially  The junctions of the gallbladder, cystic duct and common bile duct were clearly identified prior to the division of any linear structure and photo documented  Originally the [employer] clamp was placed on the cystic duct at the infundibulum and a  Cholangiogram was completed  This however demonstrated leakage of dye into the abdomen  Therefore, An incision was made in the cystic duct and the cholangiogram catheter introduced  The catheter was secured using an endoclip  The study showed no stones and good visualization of the distal and proximal biliary tree  There was some tapering noted at the distal CBD, however the small bowel filled appropriately  The catheter was then removed  The cystic duct was then doubly ligated with surgical clips  on the patient side and singly clipped on the gallbladder side and divided  The cystic artery was identified, dissected free, ligated with clips and divided as well       The gallbladder was dissected from the liver bed in retrograde fashion with the electrocautery  The gallbladder was removed  The liver bed was irrigated and inspected  Hemostasis was achieved with the electrocautery  Copious irrigation was utilized and was repeatedly aspirated until clear all particulate matter  Pneumoperitoneum was completely reduced after viewing removal of the trocars under direct vision  The wound was thoroughly irrigated and the fascia was then closed with a figure of eight suture; the skin was then closed with -0 monocryl and a sterile dressing was applied  Instrument, sponge, and needle counts were correct at closure and at the conclusion of the case       Dr Jessica Jones was present fo the [facility] case     Patient Disposition:  PACU     SIGNATURE: Tere Valdivia  DATE: January 31, 2018  TIME: 6:01 PM

## 2018-01-31 NOTE — ED NOTES
Patient requesting pain medication  Will make Dr Radha Castro aware       Ayesha Cole, RN  01/31/18 7423

## 2018-01-31 NOTE — MEDICAL STUDENT
MEDICAL STUDENT NOTE   H&P Exam - General Surgery   Sahara Cam 61 y o  male MRN: [id number]  Unit/Bed#: GYN 1 Encounter: [id number]    Assessment/Plan     Assessment:  61year old male with acute cholecystitis  Plan:  Admit to general surgery  Diet: NPO   Antibiotics: ancef/flagyl  Fluids: 1 L NS bolus for elevated Cr (unknown baseline)  Follow up: RUQ Ultrasound, CT read     History of Present Illness   Esha Casanova is a 61year old male with a history of appendectomy in 2007 who presents for evaluation of abdominal pain  He states that 2 days prior to admission he developed sharp, burning RUQ abdominal pain radiating to his back and epigastrium, with associated nausea  He received Protonix from his family doctor on 1 day prior to admission, which provided some relief  On the night of admission, he states that the pain progressively worsened and woke him up from sleep  He states his pain is relieved by getting up and moving around  He denies any fevers, chills, changes with meals, vomiting, diarrhea, decreased PO intake  Review of Systems   Constitutional: Negative for activity change, appetite change, chills and fever  Cardiovascular: Negative for chest pain, palpitations and leg swelling  Gastrointestinal: Positive for abdominal pain and nausea  Negative for abdominal distention, blood in stool, diarrhea and vomiting  Genitourinary: Negative for dysuria and frequency  Historical Information   History reviewed  No pertinent past medical history  Past Surgical History:   Procedure Laterality Date    APPENDECTOMY      KNEE ARTHROSCOPY      [place] COLONOSCOPY FLX DX W/COLLJ SPEC WHEN PFRMD N/A 7/19/2017    Procedure: COLONOSCOPY;  Surgeon: Peg Jones MD;  Location[facility];   Service: Colorectal    ROTATOR CUFF REPAIR       Social History   History   Alcohol Use    Yes     Comment: socially     History   Drug Use No     History   Smoking Status    Never Smoker   Smokeless Tobacco    Never Used     Family History: non-contributory    Meds/Allergies   all medications and allergies reviewed  No Known Allergies    Objective   First Vitals:   Blood Pressure: 145/79 (01/31/18 0042)  Pulse: 60 (01/31/18 0042)  Temperature: 98 9 °F (37 2 °C) (01/31/18 0042)  Temp Source: Tympanic (01/31/18 0042)  Respirations: 20 (01/31/18 0042)  Weight - Scale: 97 5 kg (215 lb) (01/31/18 0042)  SpO2: 100 % (01/31/18 0042)    Current Vitals:   Blood Pressure: 139/62 (01/31/18 0515)  Pulse: 60 (01/31/18 0515)  Temperature: 98 9 °F (37 2 °C) (01/31/18 0042)  Temp Source: Tympanic (01/31/18 0042)  Respirations: 18 (01/31/18 0515)  Weight - Scale: 97 5 kg (215 lb) (01/31/18 0042)  SpO2: 99 % (01/31/18 0515)      Intake/Output Summary (Last 24 hours) at 01/31/18 0535  Last data filed at 01/31/18 0305   Gross per 24 hour   Intake             1000 ml   Output                0 ml   Net             1000 ml       Invasive Devices     Peripheral Intravenous Line            Peripheral IV 01/31/18 Left Antecubital less than 1 day                Physical Exam   Constitutional: He appears well-developed and well-nourished  No distress  Cardiovascular: Normal rate, regular rhythm, normal heart sounds and intact distal pulses  Exam reveals no gallop and no friction rub  No murmur heard  Pulmonary/Chest: Effort normal and breath sounds normal  No respiratory distress  He has no wheezes  He has no rales  He exhibits no tenderness  Abdominal:   Soft, non-distended, tender in the epigastrium and RUQ  Negative quach sign, no peritoneal signs    Skin: He is not diaphoretic       Lab Results:  Lab Results   Component Value Date    WBC 9 30 01/31/2018    HGB 14 3 01/31/2018    HCT 39 8 01/31/2018    MCV 88 01/31/2018     01/31/2018     Lab Results   Component Value Date    GLUCOSE 102 01/31/2018    CALCIUM 9 4 01/31/2018     (L) 01/31/2018    K 3 8 01/31/2018    CO2 30 01/31/2018    CL 99 (L) 01/31/2018    BUN 20 01/31/2018    CREATININE 1 31 (H) 01/31/2018     Lab Results   Component Value Date    LIPASE 164 01/31/2018     UA - normal   Imaging: CT chest abdomen pelvis pending official read  EKG, Pathology, and Other Studies: I have personally reviewed pertinent reports        Code Status: No Order    Arthur Em   Medical Student

## 2018-01-31 NOTE — ED ATTENDING ATTESTATION
I, [address], DO, saw and evaluated the patient  I have discussed the patient with the resident/non-physician practitioner and agree with the resident's/non-physician practitioner's findings, Plan of Care, and MDM as documented in the resident's/non-physician practitioner's note, except where noted  All available labs and Radiology studies were reviewed  At this point I agree with the current assessment done in the Emergency Department  I have conducted an independent evaluation of this patient a history and physical is as follows:    50-year-old male presents with abdominal pain  Started Monday morning and upper abdomen that felt like a muscle cramp  Felt similar to when he had appendicitis  Saw his family doctor and given a prescription for Protonix and symptoms improved on Tuesday  Tonight pain awoke him from sleep and was very intense  Feels like it is under his ribs and upper abdomen, radiates to his back  Had some nausea but no vomiting, denies fevers, no chest pain or shortness of breath  On exam-no acute distress heart regular, lungs clear, abdomen soft mild tenderness in left lower quadrant and minimal tenderness in the upper abdomen    Plan-check abdominal labs, CT abdomen and reassess    Critical Care Time  CritCare Time    Procedures

## 2018-01-31 NOTE — CASE MANAGEMENT
Initial Clinical Review    Thank you,  [facility] in the [employer] by Fredi Schwartz for 2017  Network Utilization Review Department  Phone: [phone]; Fax [phone]  ATTENTION: The Network Utilization Review Department is now centralized for our 7 Facilities  Make a note that we have a new phone and fax numbers for our Department  Please call with any questions or concerns to [phone] and carefully follow the prompts so that you are directed to the right person  All voicemails are confidential  Fax any determinations, approvals, denials, and requests for initial or continue stay review clinical to [phone]  Due to HIGH CALL volume, it would be easier if you could please send faxed requests to expedite your requests and in part, help us provide discharge notifications faster  Admission: Date/Time/Statement: Placed in Observation status on 1/31 @ 05:56    Orders Placed This Encounter   Procedures    Place in Observation (expected length of stay for this patient is less than two midnights)     Standing Status:   Standing     Number of Occurrences:   1     Order Specific Question:   Admitting Physician     Answer:   Romelia Youngblood     Order Specific Question:   Level of Care     Answer:   Med Surg [16]     Order Specific Question:   Bed Type     Answer:   [place] [4]         ED: Date/Time/Mode of Arrival:   ED Arrival Information     Expected Arrival Acuity Means of Arrival Escorted By Service Admission Type    - 1/31/2018 00:38 Urgent Walk-In Self Surgery-General Urgent    Arrival Complaint    Abdominal pain    01/31/18 0042 Triage Started            Chief Complaint:   Chief Complaint   Patient presents with    Abdominal Pain     pt states abdominal pain since sunday  denies NVD or trouble urinating         History of Illness: Kimi Zuniga is a 61 y o  male who presents with 2 days of abdominal pain, it started in the RUQ and radiates to the epigastrum  The pain is sharp  It was initially relieved by protonix however it continued to worsen through the following days  He has had nausea but no vomiting, fevers, chills, urinary or bowel symptoms  The pain is not worsened by PO intake, he has continued to eat over the past 2 days       ED Vital Signs:   ED Triage Vitals [01/31/18 0042]   Temperature Pulse Respirations Blood Pressure SpO2   98 9 °F (37 2 °C) 60 20 145/79 100 % RA sat       Temp Source Heart Rate Source Patient Position - Orthostatic VS BP Location FiO2 (%)   Tympanic Monitor Sitting Left arm --      Pain Score       8        Wt Readings from Last 1 Encounters:   01/31/18 97 5 kg (215 lb)         Abnormal Labs/Diagnostic Test Results:    Ref Range & Units 1/31/18 0130   Sodium 136 - 145 mmol/L 135     Potassium 3 5 - 5 3 mmol/L 3 8    Chloride 100 - 108 mmol/L 99     CO2 21 - 32 mmol/L 30    Anion Gap 4 - 13 mmol/L 6    BUN 5 - 25 mg/dL 20    Creatinine 0 60 - 1 30 mg/dL 1 31     Glucose 65 - 140 mg/dL 102    Calcium 8 3 - 10 1 mg/dL 9 4    AST 5 - 45 U/L 24    ALT 12 - 78 U/L 23    Alkaline Phosphatase 46 - 116 U/L 53    Total Protein 6 4 - 8 2 g/dL 7 7    Albumin 3 5 - 5 0 g/dL 4 3    Total Bilirubin 0 20 - 1 00 mg/dL 1 29     eGFR ml/min/1 73sq m 60             Ref Range & Units 1/31/18 0130   WBC 4 31 - 10 16 Thousand/uL 9 30    RBC 3 88 - 5 62 Million/uL 4 54    Hemoglobin 12 0 - 17 0 g/dL 14 3    Hematocrit 36 5 - 49 3 % 39 8    MCV 82 - 98 fL 88    MCH 26 8 - 34 3 pg 31 5    MCHC 31 4 - 37 4 g/dL 35 9    RDW 11 6 - 15 1 % 13 4    MPV 8 9 - 12 7 fL 9 4    Platelets 828 - 857 Thousands/uL 163    nRBC /100 WBCs 0    Neutrophils Relative 43 - 75 % 80     Lymphocytes Relative 14 - 44 % 11     Monocytes Relative 4 - 12 % 7    Eosinophils Relative 0 - 6 % 2    Basophils Relative 0 - 1 % 0    Neutrophils Absolute 1 85 - 7 62 Thousands/µL 7 37    Lymphocytes Absolute 0 60 - 4 47 Thousands/µL 1 04    Monocytes Absolute 0 17 - 1 22 Thousand/µL 0 69    Eosinophils Absolute 0 00 - 0 61 Thousand/µL 0 17    Basophils Absolute 0 00 - 0 10 Thousands/µL 0 01            CT A & P - Cholelithiasis with findings suggestive of acute cholecystitis  Consider further evaluation with ultrasound to confirm this impression, if clinically indicated      US Gallbladder - pending      ED Treatment:   Medication Administration from 01/31/2018 0038 to 01/31/2018 0650       Date/Time Order Dose Route Action     01/31/2018 0128 aluminum-magnesium hydroxide-simethicone (MYLANTA) 200-200-20 mg/5 mL oral suspension 30 mL 30 mL Oral Given     01/31/2018 0128 sucralfate (CARAFATE) oral suspension 1,000 mg 1,000 mg Oral Given     01/31/2018 0128 lidocaine viscous (XYLOCAINE) 2 % mucosal solution 15 mL 15 mL Swish & Spit Given     01/31/2018 0205 sodium chloride 0 9 % bolus 1,000 mL 1,000 mL Intravenous New Bag     01/31/2018 0205 fentanyl citrate (PF) 100 MCG/2ML 50 mcg 50 mcg Intravenous Given     01/31/2018 0340 iohexol (OMNIPAQUE) 350 MG/ML injection (SINGLE-DOSE) 100 mL 100 mL Intravenous Given     01/31/2018 0413 HYDROmorphone (DILAUDID) injection 0 5 mg 0 5 mg Intravenous Given     01/31/2018 0451 sodium chloride 0 9 % bolus 1,000 mL 1,000 mL Intravenous New Bag     01/31/2018 0606 metroNIDAZOLE (FLAGYL) IVPB (premix) 500 mg 500 mg Intravenous New Bag     01/31/2018 0605 HYDROmorphone (DILAUDID) injection 0 5 mg 0 5 mg Intravenous Given       Past Medical/Surgical History:     No Additional Past Medical History     Past Surgical History:   Procedure Laterality Date    APPENDECTOMY        KNEE ARTHROSCOPY        [place] COLONOSCOPY FLX DX W/COLLJ SPEC WHEN PFRMD N/A 7/19/2017     Procedure: COLONOSCOPY;  Surgeon: Tahira Kan MD;  Location[facility];   Service: Colorectal    ROTATOR CUFF REPAIR           Admitting Diagnosis: Cholelithiasis [K80 20]  Cholecystitis [K81 9]  Abdominal pain [R10 9]    Age/Sex: 61 y o  male    Assessment/Plan:   Assessment:  60 yo M with CT findings of acute cholecystitis  T bili slightly elevated   Lipase normal      Plan:  Admit to General Surgery, Dr Kaden Guzmán U/S  Ancef/Flagyl  Likely OR today for lap breanna- discussed w/ patient  NPO, IVF  Elevated Cr- unknown if this is chronic or acute, will give additional 1 L bolus       Admission Orders:  Scheduled Meds:   cefazolin 2,000 mg Intravenous Q8H Last Rate: 2,000 mg (01/31/18 0736)   metroNIDAZOLE 500 mg Intravenous Q8H Last Rate: 500 mg (01/31/18 0606)     Continuous Infusions:   sodium chloride 125 mL/hr Last Rate: 125 mL/hr (01/31/18 0736)     PRN Meds: HYDROmorphone 0 5 mg iv 1/13  X 1     Nursing Orders - VS q 4 - Diet NPO

## 2018-01-31 NOTE — CONSULTS
Consultation - General Surgery   Chepe Benson 61 y o  male MRN: [id number]  Unit/Bed#: GYN 1 Encounter: [id number]    Assessment/Plan     Assessment:  60 yo M with CT findings of acute cholecystitis  T bili slightly elevated  Lipase normal     Plan:  Admit to General Surgery, Dr Daniela Lima U/S  Ancef/Flagyl  Likely OR today for lap breanna- discussed w/ patient  NPO, IVF  Elevated Cr- unknown if this is chronic or acute, will give additional 1 L bolus    History of Present Illness     HPI:  Chepe Benson is a 61 y o  male who presents with 2 days of abdominal pain, it started in the RUQ and radiates to the epigastrum  The pain is sharp  It was initially relieved by protonix however it continued to worsen through the following days  He has had nausea but no vomiting, fevers, chills, urinary or bowel symptoms  The pain is not worsened by PO intake, he has continued to eat over the past 2 days  Hx appendectomy 2007, otherwise healthy  Consults    Review of Systems as per HPI    Historical Information   History reviewed  No pertinent past medical history  Past Surgical History:   Procedure Laterality Date    APPENDECTOMY      KNEE ARTHROSCOPY      [place] COLONOSCOPY FLX DX W/COLLJ SPEC WHEN PFRMD N/A 7/19/2017    Procedure: COLONOSCOPY;  Surgeon: Charles Young MD;  [facility];   Service: Colorectal    ROTATOR CUFF REPAIR       Social History   History   Alcohol Use    Yes     Comment: socially     History   Drug Use No     History   Smoking Status    Never Smoker   Smokeless Tobacco    Never Used     Family History: non-contributory    Meds/Allergies   all current active meds have been reviewed  No Known Allergies    Objective   First Vitals:   Blood Pressure: 145/79 (01/31/18 0042)  Pulse: 60 (01/31/18 0042)  Temperature: 98 9 °F (37 2 °C) (01/31/18 0042)  Temp Source: Tympanic (01/31/18 0042)  Respirations: 20 (01/31/18 0042)  Weight - Scale: 97 5 kg (215 lb) (01/31/18 0042)  SpO2: 100 % (01/31/18 0042)    Current Vitals:   Blood Pressure: 139/62 (01/31/18 0515)  Pulse: 60 (01/31/18 0515)  Temperature: 98 9 °F (37 2 °C) (01/31/18 0042)  Temp Source: Tympanic (01/31/18 0042)  Respirations: 18 (01/31/18 0515)  Weight - Scale: 97 5 kg (215 lb) (01/31/18 0042)  SpO2: 99 % (01/31/18 0515)      Intake/Output Summary (Last 24 hours) at 01/31/18 0526  Last data filed at 01/31/18 0305   Gross per 24 hour   Intake             1000 ml   Output                0 ml   Net             1000 ml       Invasive Devices     Peripheral Intravenous Line            Peripheral IV 01/31/18 Left Antecubital less than 1 day                Physical Exam   Physical Exam:   Gen: A&O, NAD  Cardio: RRR  Lungs: CTAB  Abd: Soft, non distended  Tender in RUQ, + murphys  No rebound or guarding  Ext: Warm, dry  Vasc; Intact         Lab Results:   CBC:   Lab Results   Component Value Date    WBC 9 30 01/31/2018    HGB 14 3 01/31/2018    HCT 39 8 01/31/2018    MCV 88 01/31/2018     01/31/2018    MCH 31 5 01/31/2018    MCHC 35 9 01/31/2018    RDW 13 4 01/31/2018    MPV 9 4 01/31/2018    NRBC 0 01/31/2018   , CMP:   Lab Results   Component Value Date     (L) 01/31/2018    K 3 8 01/31/2018    CL 99 (L) 01/31/2018    CO2 30 01/31/2018    ANIONGAP 6 01/31/2018    BUN 20 01/31/2018    CREATININE 1 31 (H) 01/31/2018    GLUCOSE 102 01/31/2018    CALCIUM 9 4 01/31/2018    AST 24 01/31/2018    ALT 23 01/31/2018    ALKPHOS 53 01/31/2018    PROT 7 7 01/31/2018    BILITOT 1 29 (H) 01/31/2018    EGFR 60 01/31/2018     Imaging: I have personally reviewed pertinent reports  Cholelithiasis and findings most compatible with acute cholecystitis with gallbladder wall  hyperemia/edema/thickening and pericholecystic inflammatory changes  EKG, Pathology, and Other Studies: I have personally reviewed pertinent reports  Counseling / Coordination of Care  Total floor / unit time spent today 40 minutes    Greater than 50% of total time was spent with the patient and / or family counseling and / or coordination of care  A description of the counseling / coordination of care: Sachin Ruano

## 2018-01-31 NOTE — PLAN OF CARE
DISCHARGE PLANNING     Discharge to home or other facility with appropriate resources Progressing        PAIN - ADULT     Verbalizes/displays adequate comfort level or baseline comfort level Progressing        SAFETY ADULT     Patient will remain free of falls Progressing     Maintain or return to baseline ADL function Progressing     Maintain or return mobility status to optimal level Progressing

## 2018-02-01 VITALS
WEIGHT: 215 LBS | OXYGEN SATURATION: 95 % | HEIGHT: 74 IN | HEART RATE: 57 BPM | RESPIRATION RATE: 20 BRPM | DIASTOLIC BLOOD PRESSURE: 62 MMHG | BODY MASS INDEX: 27.59 KG/M2 | TEMPERATURE: 97.6 F | SYSTOLIC BLOOD PRESSURE: 120 MMHG

## 2018-02-01 LAB
ALBUMIN SERPL BCP-MCNC: 2.9 G/DL (ref 3.5–5)
ALP SERPL-CCNC: 44 U/L (ref 46–116)
ALT SERPL W P-5'-P-CCNC: 33 U/L (ref 12–78)
ANION GAP SERPL CALCULATED.3IONS-SCNC: 5 MMOL/L (ref 4–13)
AST SERPL W P-5'-P-CCNC: 32 U/L (ref 5–45)
BASOPHILS # BLD AUTO: 0 THOUSANDS/ΜL (ref 0–0.1)
BASOPHILS NFR BLD AUTO: 0 % (ref 0–1)
BILIRUB SERPL-MCNC: 0.81 MG/DL (ref 0.2–1)
BUN SERPL-MCNC: 16 MG/DL (ref 5–25)
CALCIUM SERPL-MCNC: 8.5 MG/DL (ref 8.3–10.1)
CHLORIDE SERPL-SCNC: 103 MMOL/L (ref 100–108)
CO2 SERPL-SCNC: 25 MMOL/L (ref 21–32)
CREAT SERPL-MCNC: 1.09 MG/DL (ref 0.6–1.3)
EOSINOPHIL # BLD AUTO: 0 THOUSAND/ΜL (ref 0–0.61)
EOSINOPHIL NFR BLD AUTO: 0 % (ref 0–6)
ERYTHROCYTE [DISTWIDTH] IN BLOOD BY AUTOMATED COUNT: 13.9 % (ref 11.6–15.1)
GFR SERPL CREATININE-BSD FRML MDRD: 74 ML/MIN/1.73SQ M
GLUCOSE SERPL-MCNC: 174 MG/DL (ref 65–140)
HCT VFR BLD AUTO: 31.6 % (ref 36.5–49.3)
HGB BLD-MCNC: 11 G/DL (ref 12–17)
LYMPHOCYTES # BLD AUTO: 0.65 THOUSANDS/ΜL (ref 0.6–4.47)
LYMPHOCYTES NFR BLD AUTO: 5 % (ref 14–44)
MCH RBC QN AUTO: 30.6 PG (ref 26.8–34.3)
MCHC RBC AUTO-ENTMCNC: 34.8 G/DL (ref 31.4–37.4)
MCV RBC AUTO: 88 FL (ref 82–98)
MONOCYTES # BLD AUTO: 0.67 THOUSAND/ΜL (ref 0.17–1.22)
MONOCYTES NFR BLD AUTO: 6 % (ref 4–12)
NEUTROPHILS # BLD AUTO: 10.77 THOUSANDS/ΜL (ref 1.85–7.62)
NEUTS SEG NFR BLD AUTO: 89 % (ref 43–75)
NRBC BLD AUTO-RTO: 0 /100 WBCS
PLATELET # BLD AUTO: 161 THOUSANDS/UL (ref 149–390)
PMV BLD AUTO: 10 FL (ref 8.9–12.7)
POTASSIUM SERPL-SCNC: 4.5 MMOL/L (ref 3.5–5.3)
PROT SERPL-MCNC: 6.3 G/DL (ref 6.4–8.2)
RBC # BLD AUTO: 3.6 MILLION/UL (ref 3.88–5.62)
SODIUM SERPL-SCNC: 133 MMOL/L (ref 136–145)
WBC # BLD AUTO: 12.12 THOUSAND/UL (ref 4.31–10.16)

## 2018-02-01 PROCEDURE — 99024 POSTOP FOLLOW-UP VISIT: CPT | Performed by: SURGERY

## 2018-02-01 PROCEDURE — 80053 COMPREHEN METABOLIC PANEL: CPT | Performed by: SURGERY

## 2018-02-01 PROCEDURE — 85025 COMPLETE CBC W/AUTO DIFF WBC: CPT | Performed by: SURGERY

## 2018-02-01 RX ORDER — ACETAMINOPHEN 325 MG/1
TABLET ORAL
Qty: 30 TABLET | Refills: 0 | Status: SHIPPED | OUTPATIENT
Start: 2018-02-01 | End: 2018-09-12

## 2018-02-01 RX ORDER — OXYCODONE HYDROCHLORIDE AND ACETAMINOPHEN 5; 325 MG/1; MG/1
1 TABLET ORAL EVERY 4 HOURS PRN
Qty: 15 TABLET | Refills: 0
Start: 2018-02-01 | End: 2018-02-11

## 2018-02-01 RX ADMIN — SODIUM CHLORIDE 125 ML/HR: 0.9 INJECTION, SOLUTION INTRAVENOUS at 02:49

## 2018-02-01 RX ADMIN — OXYCODONE HYDROCHLORIDE 5 MG: 5 TABLET ORAL at 10:51

## 2018-02-01 RX ADMIN — OXYCODONE HYDROCHLORIDE 5 MG: 5 TABLET ORAL at 02:27

## 2018-02-01 RX ADMIN — OXYCODONE HYDROCHLORIDE 5 MG: 5 TABLET ORAL at 06:17

## 2018-02-01 RX ADMIN — HEPARIN SODIUM 5000 UNITS: 5000 INJECTION, SOLUTION INTRAVENOUS; SUBCUTANEOUS at 06:16

## 2018-02-01 NOTE — PROGRESS NOTES
Progress Note - General Surgery   Sahara Cam 61 y o  male MRN: [id number]  Unit/Bed#: [facility] 619-01 Encounter: [id number]    Assessment:  61 y o  M w/ acute gangrenous cholecystitis s/p lap breanna w/ IOC; no filling defects in CBD and normal Tbili today    Pain controlled  Tolerating diet    Plan:  Regular diet  PRN pain control  D/c to home today    Subjective/Objective     Subjective: Denies complaint, pain controlled    Objective:     Blood pressure 120/62, pulse 57, temperature 97 6 °F (36 4 °C), temperature source Oral, resp  rate 20, height 6' 2" (1 88 m), weight 97 5 kg (215 lb), SpO2 95 %  ,Body mass index is 27 6 kg/m²        Intake/Output Summary (Last 24 hours) at 02/01/18 0928  Last data filed at 02/01/18 7860   Gross per 24 hour   Intake          4813 74 ml   Output             2175 ml   Net          2638 74 ml       Invasive Devices     Peripheral Intravenous Line            Peripheral IV 01/31/18 Left Antecubital 1 day                Physical Exam:   NAD  CV RRR  Pulm CTA  Abd soft, nondistended, incisions c/d/i    Lab, Imaging and other studies:  CBC:   Lab Results   Component Value Date    WBC 12 12 (H) 02/01/2018    HGB 11 0 (L) 02/01/2018    HCT 31 6 (L) 02/01/2018    MCV 88 02/01/2018     02/01/2018    MCH 30 6 02/01/2018    MCHC 34 8 02/01/2018    RDW 13 9 02/01/2018    MPV 10 0 02/01/2018    NRBC 0 02/01/2018   , CMP:   Lab Results   Component Value Date     (L) 02/01/2018    K 4 5 02/01/2018     02/01/2018    CO2 25 02/01/2018    ANIONGAP 5 02/01/2018    BUN 16 02/01/2018    CREATININE 1 09 02/01/2018    GLUCOSE 174 (H) 02/01/2018    CALCIUM 8 5 02/01/2018    AST 32 02/01/2018    ALT 33 02/01/2018    ALKPHOS 44 (L) 02/01/2018    PROT 6 3 (L) 02/01/2018    BILITOT 0 81 02/01/2018    EGFR 74 02/01/2018     VTE Pharmacologic Prophylaxis: Heparin  VTE Mechanical Prophylaxis: sequential compression device

## 2018-02-01 NOTE — PROGRESS NOTES
Post-Op Check    Assessment: 61 y  o M s/p  Lap breanna     Plan:  Regular diet  IVF  PRN pain meds  AM labs    Subjective:  Doing well  Pain controlled  No N/V  Urinated  Vitals:    01/31/18 2300   BP: 135/52   Pulse: 68   Resp: 20   Temp: 97 5 °F (36 4 °C)   SpO2: 95%       PE:  Gen: NAD, AAOx3  CV: RRR  Pulm: no resp distress  Abd: Soft, incisional tenderness, non-distended   Incisions c/d/i    Natalie Hearn PGY2  General Surgery

## 2018-02-01 NOTE — DISCHARGE SUMMARY
Discharge Summary - General Surgery   Jeanette Chan 61 y o  male MRN: [id number]  Unit/Bed#: [facility] 396-48 Encounter: [id number]    Admission Date: 1/31/2018     Discharge Date: 2/1/2018    Admitting Diagnosis: Cholelithiasis [K80 20]  Cholecystitis [K81 9]  Abdominal pain [R10 9]    Discharge Diagnosis:  Acute cholecystitis    Attending:  Dr Zoila Soto Physician(s):  None    Procedures Performed:   Orders Placed This Encounter   Procedures    ED ECG Documentation Only       Pathology:  Pending    Hospital Course:  Patient presents to the emergency department on 01/31 with 2 days of abdominal pain  After workup she was found to have acute cholecystitis changes on CT abdomen pelvis as well as a slightly elevated bilirubin level  Patient was started on IV Ancef and Flagyl before proceeding to the OR for laparoscopic cholecystectomy with intraoperative cholangiogram on 01/31  Cholangiogram showed no common bile duct stones and good visualization of the biliary tree  The gallbladder did appear to be necrotic and inflamed, but the procedure was uncomplicated  On postop day 0 the patient was tolerating a normal diet and his pain was controlled  On postop day 1 he was deemed suitable for discharge and sent home with po pain medications and instructions to follow up with General surgery in 2 weeks  Condition at Discharge: good     Discharge instructions/Information to patient and family:   See after visit summary for information provided to patient and family  Provisions for Follow-Up Care:  See after visit summary for information related to follow-up care and any pertinent home health orders  Disposition: Home    Planned Readmission: No    Discharge Statement   I spent 30 minutes discharging the patient  This time was spent on the day of discharge  I had direct contact with the patient on the day of discharge   Additional documentation is required if more than 30 minutes were spent on discharge  Discharge Medications:  See after visit summary for reconciled discharge medications provided to patient and family

## 2018-02-01 NOTE — PROGRESS NOTES
Patient awake, alert, and oriented to person, place, time, and situation  Patient ambulated unit  Post ambulation, he was complaining of abdominal pain (6/10)  Trocar sites are clean, dry, and intact  5mg oxy given  Patient resting in bed

## 2018-02-01 NOTE — DISCHARGE INSTRUCTIONS
Cholecystitis   WHAT YOU SHOULD KNOW:   Cholecystitis is inflammation of your gallbladder  Your gallbladder stores bile, which helps break down the fat that you eat  It also helps remove certain chemicals from your body  You may have a sudden, severe attack (acute cholecystitis) or several mild attacks (chronic cholecystitis)  AFTER YOU LEAVE:   Medicines:   · Pain medicine: You may need medicine to take away or decrease pain  ¨ Learn how to take your medicine  Ask what medicine and how much you should take  Be sure you know how, when, and how often to take it  ¨ Do not wait until the pain is severe before you take your medicine  Tell caregivers if your pain does not decrease  ¨ Pain medicine can make you dizzy or sleepy  Prevent falls by calling someone when you get out of bed or if you need help  · Antibiotics: This medicine is given to fight or prevent an infection caused by bacteria  Always take your antibiotics exactly as ordered by your healthcare provider  Do not stop taking your medicine unless directed by your healthcare provider  Never save antibiotics or take leftover antibiotics that were given to you for another illness  · NSAIDs  help decrease swelling and pain or fever  This medicine is available with or without a doctor's order  NSAIDs can cause stomach bleeding or kidney problems in certain people  If you take blood thinner medicine, always ask your healthcare provider if NSAIDs are safe for you  Always read the medicine label and follow directions  · Take your medicine as directed  Call your healthcare provider if you think your medicine is not helping or if you have side effects  Tell him if you are allergic to any medicine  Keep a list of the medicines, vitamins, and herbs you take  Include the amounts, and when and why you take them  Bring the list or the pill bottles to follow-up visits  Carry your medicine list with you in case of an emergency    Follow up with your healthcare provider as directed:  Write down your questions so you remember to ask them during your visits  Eat a variety of healthy foods: This may help you have more energy and heal faster  Healthy foods include fruit, vegetables, whole-grain breads, low-fat dairy products, beans, lean meat, and fish  Ask if you need to be on a special diet  Contact your healthcare provider if:   · You have a fever or chills  · You have nausea or vomiting  · You have decreased appetite  · You have pain when you urinate  · Your skin or eyes turn yellow  · You have questions or concerns about your condition or care  Seek care immediately or call 911 if:   · You have severe pain in your abdomen  · You have chest pain or trouble breathing  · You urinate less than usual   © 2014 [employer] is for End User's use only and may not be sold, redistributed or otherwise used for commercial purposes  All illustrations and images included in CareNotes® are the copyrighted property of A D A M [employer]  or Fredi Schwartz  The above information is an  only  It is not intended as medical advice for individual conditions or treatments  Talk to your doctor, nurse or pharmacist before following any medical regimen to see if it is safe and effective for you

## 2018-02-01 NOTE — CASE MANAGEMENT
Continued Stay Review    Thank you,  [facility] in the [facility] by Fredi Schwartz for 2017  Network Utilization Review Department  Phone: [phone]; Fax [phone]  ATTENTION: The Network Utilization Review Department is now centralized for our 7 Facilities  Make a note that we have a new phone and fax numbers for our Department  Please call with any questions or concerns to [phone] and carefully follow the prompts so that you are directed to the right person  All voicemails are confidential  Fax any determinations, approvals, denials, and requests for initial or continue stay review clinical to [phone]  Due to HIGH CALL volume, it would be easier if you could please send faxed requests to expedite your requests and in part, help us provide discharge notifications faster      Date:2/1/2018 01/31/18 0556  Place in Observation (expected length of stay for this patient is less than two midnights) (ED Bridging Orders Panel) Once     Transfer Service: Surgery-General       Question Answer    Admitting Physician Cherie ARCHER    Level of Care Med Surg                Vital Signs: /62   Pulse 57   Temp 97 6 °F (36 4 °C) (Oral)   Resp 20   Ht 6' 2" (1 88 m)   Wt 97 5 kg (215 lb)   SpO2 95%   BMI 27 60 kg/m²     Medications:   Scheduled Meds:     heparin (porcine) 5,000 Units Subcutaneous Q8H [facility]      Continuous Infusions:   lactated ringers 100 mL/hr Last Rate: Stopped (01/31/18 9152)   sodium chloride 125 mL/hr Last Rate: 125 mL/hr (02/01/18 0249)     PRN Meds:   acetaminophen    HYDROmorphone 0 5 mg iv 1/31 x 2 - 1 mg iv 1/31 x 3     oxyCODONE 5 mg po 2/1 x 2     oxyCODONE    Nursing orders - VS q 4 - Up and Oob as tolerated - I & O q shift - SCD's to le's - Diet regular house     Abnormal Labs/Diagnostic Results:     Ref Range & Units 2/1/18 0515   Sodium 136 - 145 mmol/L 133     Potassium 3 5 - 5 3 mmol/L 4 5    Chloride 100 - 108 mmol/L 103    CO2 21 - 32 mmol/L 25    Anion Gap 4 - 13 mmol/L 5    BUN 5 - 25 mg/dL 16    Creatinine 0 60 - 1 30 mg/dL 1 09    Glucose 65 - 140 mg/dL 174     Calcium 8 3 - 10 1 mg/dL 8 5    AST 5 - 45 U/L 32    ALT 12 - 78 U/L 33    Alkaline Phosphatase 46 - 116 U/L 44     Total Protein 6 4 - 8 2 g/dL 6 3     Albumin 3 5 - 5 0 g/dL 2 9     Total Bilirubin 0 20 - 1 00 mg/dL 0 81    eGFR ml/min/1 73sq m 74               Ref Range & Units 2/1/18 0515   WBC 4 31 - 10 16 Thousand/uL 12 12     RBC 3 88 - 5 62 Million/uL 3 60     Hemoglobin 12 0 - 17 0 g/dL 11 0     Hematocrit 36 5 - 49 3 % 31 6     MCV 82 - 98 fL 88    MCH 26 8 - 34 3 pg 30 6    MCHC 31 4 - 37 4 g/dL 34 8    RDW 11 6 - 15 1 % 13 9    MPV 8 9 - 12 7 fL 10 0    Platelets 039 - 619 Thousands/uL 161    nRBC /100 WBCs 0    Neutrophils Relative 43 - 75 % 89     Lymphocytes Relative 14 - 44 % 5     Monocytes Relative 4 - 12 % 6    Eosinophils Relative 0 - 6 % 0    Basophils Relative 0 - 1 % 0    Neutrophils Absolute 1 85 - 7 62 Thousands/µL 10 77     Lymphocytes Absolute 0 60 - 4 47 Thousands/µL 0 65    Monocytes Absolute 0 17 - 1 22 Thousand/µL 0 67    Eosinophils Absolute 0 00 - 0 61 Thousand/µL 0 00    Basophils Absolute 0 00 - 0 10 Thousands/µL 0 00            US Gallbladder  On 1/31 - Gallstones with thickened gallbladder wall and pericholecystic fluid concerning for developing acute cholecystitis  Correlate with appropriate laboratory workup physical exam findings      Age/Sex: 61 y o  male     Assessment/Plan:   Op report 1/31 - Procedure(s) (LRB):  CHOLECYSTECTOMY LAPAROSCOPIC (N/A)  CHOLANGIOGRAM (N/A)  Anesthesia - General   Operative Findings:  inflammed gallbladder with thick rind noted        Discharge Plan:TBD

## 2018-02-15 ENCOUNTER — OFFICE VISIT (OUTPATIENT)
Dept: SURGERY | Facility: CLINIC | Age: 58
End: 2018-02-15
Payer: COMMERCIAL

## 2018-02-15 VITALS
HEIGHT: 74 IN | DIASTOLIC BLOOD PRESSURE: 76 MMHG | SYSTOLIC BLOOD PRESSURE: 112 MMHG | WEIGHT: 206.4 LBS | TEMPERATURE: 97.2 F | BODY MASS INDEX: 26.49 KG/M2

## 2018-02-15 DIAGNOSIS — K80.20 CALCULUS OF GALLBLADDER WITHOUT CHOLECYSTITIS WITHOUT OBSTRUCTION: Primary | ICD-10-CM

## 2018-02-15 PROCEDURE — 99213 OFFICE O/P EST LOW 20 MIN: CPT | Performed by: SURGERY

## 2018-02-15 NOTE — PATIENT INSTRUCTIONS
1)Follow-up in 2 weeks - May cancel appointment if no issues at that time  2)May resume normal physical activities and diet  3)Discussed evaluation for abnormal bleeding with PCP

## 2018-02-15 NOTE — LETTER
February 15[birth year] 2036     Ji [address] [address]    Patient: Ramya Charles   YOB: 1960   Date of Visit: 2/15/2018       Dear Dr Kaila Whitley: Thank you for referring Seamus Spicer to me for evaluation  Below are my notes for this consultation  Noted large area of ecchymosis following laparoscopic cholecystectomy  He also reports to me history of significant bruising after minor orthopaedic injuries  Discussed evaluation for coagulation abnormality as an outpatient  If you have questions, please do not hesitate to call me  I look forward to following your patient along with you  Sincerely,        Laura Viramontes MD        CC: No Recipients  Laura Viramontes MD  2/15/2018  9:40 AM  Sign at close encounter  Office Visit - General Surgery  Ramya Charles MRN: [id number]  Encounter: [id number]    Assessment and Plan  Return for follow-up in 2 weeks  Problem List Items Addressed This Visit     None          Chief Complaint:  Ramya Charles is a 61 y o  male who presents for Cholelithiasis (p/o)    Subjective    Concerned with large bruising across abdomen  Still reports some pain localized upper abdomen and RUQ  Tolerating diet with normal GI function    Past Medical History  No past medical history on file  Past Surgical History  Past Surgical History:   Procedure Laterality Date    APPENDECTOMY      CHOLANGIOGRAM N/A 1/31/2018    Procedure: CHOLANGIOGRAM;  Surgeon: Licha High MD;  [facility];  Service: General    CHOLECYSTECTOMY LAPAROSCOPIC N/A 1/31/2018    Procedure: Michelle Cortez;  Surgeon: Licha High MD;  [facility];  Service: General    KNEE ARTHROSCOPY      VA COLONOSCOPY FLX DX W/COLLJ SPEC WHEN PFRMD N/A 7/19/2017    Procedure: COLONOSCOPY;  Surgeon: Kendrick Ahumada, MD;  [facility]: [facility];   Service: Colorectal    ROTATOR CUFF REPAIR         Family History  No family history on file     Medications  Current Outpatient Prescriptions on File Prior to Visit   Medication Sig Dispense Refill    acetaminophen (TYLENOL) 325 mg tablet You may alternate Tylenol with Percocet for pain every 4-6 hours as needed  30 tablet 0    co-enzyme Q-10 30 MG capsule Take 30 mg by mouth 3 (three) times a day      fluticasone (FLONASE) 50 mcg/act nasal spray 2 sprays into each nostril daily      multivitamin (THERAGRAN) TABS Take 1 tablet by mouth daily      Omega-3 Fatty Acids (FISH OIL) 500 MG CAPS Take by mouth daily       No current facility-administered medications on file prior to visit  Allergies  Allergies   Allergen Reactions    Other      Benzoin       Review of Systems   Gastrointestinal: Positive for abdominal pain  Negative for abdominal distention, anal bleeding, blood in stool, constipation, diarrhea, nausea, rectal pain and vomiting  As noted in HPI   All other systems reviewed and are negative  Objective  Vitals:    02/15/18 0904   BP: 112/76   Temp: (!) 97 2 °F (36 2 °C)       Physical Exam   Abdominal: He exhibits no distension and no mass  There is no tenderness  There is no rebound and no guarding  Soft and non-tender  Trocar surgical sites well healed without palpable fascial hernia  Large ecchymosis originating at umbilicus and extending to right flank

## 2018-02-15 NOTE — PROGRESS NOTES
Office Visit - General Surgery  Maxime Howard MRN: [id number]  Encounter: [id number]    Assessment and Plan  Return for follow-up in 2 weeks  Problem List Items Addressed This Visit     None          Chief Complaint:  Maxime Howard is a 61 y o  male who presents for Cholelithiasis (p/o)    Subjective    Concerned with large bruising across abdomen  Still reports some pain localized upper abdomen and RUQ  Tolerating diet with normal GI function    Past Medical History  No past medical history on file  Past Surgical History  Past Surgical History:   Procedure Laterality Date    APPENDECTOMY      CHOLANGIOGRAM N/A 1/31/2018    Procedure: CHOLANGIOGRAM;  Surgeon: Lizette Henley MD;  [facility];  Service: General    CHOLECYSTECTOMY LAPAROSCOPIC N/A 1/31/2018    Procedure: Deysi Cuff;  Surgeon: Lizette Henley MD;  [facility];  Service: General    KNEE ARTHROSCOPY      MT COLONOSCOPY FLX DX W/COLLJ SPEC WHEN PFRMD N/A 7/19/2017    Procedure: COLONOSCOPY;  Surgeon: Radha Fowler MD;  [facility]; Service: Colorectal    ROTATOR CUFF REPAIR         Family History  No family history on file  Medications  Current Outpatient Prescriptions on File Prior to Visit   Medication Sig Dispense Refill    acetaminophen (TYLENOL) 325 mg tablet You may alternate Tylenol with Percocet for pain every 4-6 hours as needed  30 tablet 0    co-enzyme Q-10 30 MG capsule Take 30 mg by mouth 3 (three) times a day      fluticasone (FLONASE) 50 mcg/act nasal spray 2 sprays into each nostril daily      multivitamin (THERAGRAN) TABS Take 1 tablet by mouth daily      Omega-3 Fatty Acids (FISH OIL) 500 MG CAPS Take by mouth daily       No current facility-administered medications on file prior to visit  Allergies  Allergies   Allergen Reactions    Other      Benzoin       Review of Systems   Gastrointestinal: Positive for abdominal pain   Negative for abdominal distention, anal bleeding, blood in stool, constipation, diarrhea, nausea, rectal pain and vomiting  As noted in HPI   All other systems reviewed and are negative  Objective  Vitals:    02/15/18 0904   BP: 112/76   Temp: (!) 97 2 °F (36 2 °C)       Physical Exam   Abdominal: He exhibits no distension and no mass  There is no tenderness  There is no rebound and no guarding  Soft and non-tender  Trocar surgical sites well healed without palpable fascial hernia  Large ecchymosis originating at umbilicus and extending to right flank

## 2018-06-22 ENCOUNTER — TRANSCRIBE ORDERS (OUTPATIENT)
Dept: ADMINISTRATIVE | Facility: HOSPITAL | Age: 58
End: 2018-06-22

## 2018-06-22 DIAGNOSIS — M25.522 LEFT ELBOW PAIN: Primary | ICD-10-CM

## 2018-06-22 DIAGNOSIS — M25.512 LEFT SHOULDER PAIN, UNSPECIFIED CHRONICITY: ICD-10-CM

## 2018-06-28 ENCOUNTER — HOSPITAL ENCOUNTER (OUTPATIENT)
Dept: RADIOLOGY | Age: 58
Discharge: HOME | End: 2018-06-28
Payer: COMMERCIAL

## 2018-06-28 DIAGNOSIS — M25.512 LEFT SHOULDER PAIN, UNSPECIFIED CHRONICITY: ICD-10-CM

## 2018-06-28 DIAGNOSIS — M25.522 LEFT ELBOW PAIN: ICD-10-CM

## 2018-06-28 PROCEDURE — 73221 MRI JOINT UPR EXTREM W/O DYE: CPT

## 2018-07-23 ENCOUNTER — HOSPITAL ENCOUNTER (OUTPATIENT)
Dept: RADIOLOGY | Facility: HOSPITAL | Age: 58
Discharge: HOME | End: 2018-07-23
Attending: ORTHOPAEDIC SURGERY
Payer: COMMERCIAL

## 2018-07-23 ENCOUNTER — OFFICE VISIT (OUTPATIENT)
Dept: OBGYN CLINIC | Facility: HOSPITAL | Age: 58
End: 2018-07-23
Payer: COMMERCIAL

## 2018-07-23 VITALS
HEART RATE: 58 BPM | BODY MASS INDEX: 26.95 KG/M2 | WEIGHT: 210 LBS | HEIGHT: 74 IN | DIASTOLIC BLOOD PRESSURE: 83 MMHG | SYSTOLIC BLOOD PRESSURE: 133 MMHG

## 2018-07-23 DIAGNOSIS — M17.11 PRIMARY OSTEOARTHRITIS OF RIGHT KNEE: Primary | ICD-10-CM

## 2018-07-23 DIAGNOSIS — M25.561 ACUTE PAIN OF RIGHT KNEE: ICD-10-CM

## 2018-07-23 PROCEDURE — 73564 X-RAY EXAM KNEE 4 OR MORE: CPT

## 2018-07-23 PROCEDURE — 20610 DRAIN/INJ JOINT/BURSA W/O US: CPT | Performed by: ORTHOPAEDIC SURGERY

## 2018-07-23 PROCEDURE — 73562 X-RAY EXAM OF KNEE 3: CPT

## 2018-07-23 PROCEDURE — 99203 OFFICE O/P NEW LOW 30 MIN: CPT | Performed by: ORTHOPAEDIC SURGERY

## 2018-07-23 RX ORDER — BUPIVACAINE HYDROCHLORIDE 2.5 MG/ML
2 INJECTION, SOLUTION INFILTRATION; PERINEURAL
Status: COMPLETED | OUTPATIENT
Start: 2018-07-23 | End: 2018-07-23

## 2018-07-23 RX ORDER — BETAMETHASONE SODIUM PHOSPHATE AND BETAMETHASONE ACETATE 3; 3 MG/ML; MG/ML
6 INJECTION, SUSPENSION INTRA-ARTICULAR; INTRALESIONAL; INTRAMUSCULAR; SOFT TISSUE
Status: COMPLETED | OUTPATIENT
Start: 2018-07-23 | End: 2018-07-23

## 2018-07-23 RX ADMIN — BETAMETHASONE SODIUM PHOSPHATE AND BETAMETHASONE ACETATE 6 MG: 3; 3 INJECTION, SUSPENSION INTRA-ARTICULAR; INTRALESIONAL; INTRAMUSCULAR; SOFT TISSUE at 11:09

## 2018-07-23 RX ADMIN — BUPIVACAINE HYDROCHLORIDE 2 ML: 2.5 INJECTION, SOLUTION INFILTRATION; PERINEURAL at 11:09

## 2018-07-23 NOTE — PROGRESS NOTES
Assessment  Diagnoses and all orders for this visit:    Primary osteoarthritis of right knee          Discussion and Plan:    The patient has medial compartment osteoarthritis of his right knee which has become symptomatic, symptomatic treatment was discussed and the use of corticosteroid injection is indicated today which was provided for his comfort  If this fails to improve consideration to either a medial  bracing for viscosupplementation would be provided, we also discussed being fitted for appropriate shoes as he notices he has a new shoe that he is wearing has caused increasing pain in the knee as well as the lateral leg and great toe of the right foot  He will contact me if he needs further treatment going forward    Subjective:   Patient ID: Erica Márquez is a 61 y o  male      Patient presents with a chief complaint of right knee pain  He is very active individual who participates in [employer] races as well as cross fit who has had history of bilateral partial medial meniscectomies in early 2000s  He has done very well until recently the right knee started bothering him with his activities, he describes the pain as dull but at times it can be sharp is localized to the medial joint line, it is worse activity is relieved by rest, it is bad with deep flexion specially when he is doing leg press or squats  He has been able to compete and train but in the recent golf event when he was walking he developed significant pain in the right knee            The following portions of the patient's history were reviewed and updated as appropriate: allergies, current medications, past family history, past medical history, past social history, past surgical history and problem list     Review of Systems   Constitutional: Negative for chills and fever  HENT: Negative for hearing loss  Eyes: Negative for visual disturbance  Respiratory: Negative for shortness of breath      Cardiovascular: Negative for chest pain  Gastrointestinal: Negative for abdominal pain  Musculoskeletal:        As reviewed in the HPI   Skin: Negative for rash  Neurological:        As reviewed in the HPI   Psychiatric/Behavioral: Negative for agitation  Objective:  Right Knee Exam   Swelling: None  Effusion: No    Tenderness   The patient is experiencing tenderness in the medial joint line  Range of Motion   Extension: 0  Flexion:     120    Tests   McMurrays:  Medial - Positive      Lateral - Negative  Lachman:  Anterior - Negative      Drawer:       Posterior - Negative  Varus:  Negative  Valgus: Negative  Pivot Shift: Negative  Patellar Apprehension: No    Comments:  Varus alignment of the knee  Gapping about 5 mm with valgus stress but no pain          Physical Exam   Constitutional: He is oriented to person, place, and time  He appears well-developed and well-nourished  HENT:   Head: Normocephalic and atraumatic  Neck: Normal range of motion  Neck supple  Cardiovascular: Normal rate and regular rhythm  Pulmonary/Chest: Effort normal  No respiratory distress  Abdominal: Soft  He exhibits no distension  Musculoskeletal:        Right knee: Medial joint line tenderness noted  Neurological: He is alert and oriented to person, place, and time  Skin: Skin is warm and dry  Psychiatric: He has a normal mood and affect  His behavior is normal    Nursing note and vitals reviewed  I have personally reviewed pertinent films in PACS and my interpretation is as follows      Weight-bearing views bilateral knees show moderate medial compartment osteoarthritis of the right knee, with the PA flexion view there is complete loss of articular cartilage on the medial joint of the right knee      Large joint arthrocentesis  Date/Time: 7/23/2018 11:09 AM  Consent given by: patient  Timeout: Immediately prior to procedure a time out was called to verify the correct patient, procedure, equipment, support staff and site/side marked as required   Supporting Documentation  Indications: pain   Procedure Details  Location: knee - R knee  Preparation: Patient was prepped and draped in the usual sterile fashion  Needle size: 22 G  Ultrasound guidance: no  Medications administered: 2 mL bupivacaine 0 25 %; 6 mg betamethasone acetate-betamethasone sodium phosphate 6 (3-3) mg/mL    Patient tolerance: patient tolerated the procedure well with no immediate complications  Dressing:  Sterile dressing applied

## 2018-07-23 NOTE — PATIENT INSTRUCTIONS

## 2018-08-13 DIAGNOSIS — M54.16 RADICULOPATHY, LUMBAR REGION: Primary | ICD-10-CM

## 2018-08-20 ENCOUNTER — EVALUATION (OUTPATIENT)
Dept: PHYSICAL THERAPY | Facility: CLINIC | Age: 58
End: 2018-08-20
Payer: COMMERCIAL

## 2018-08-20 DIAGNOSIS — M54.16 RADICULOPATHY, LUMBAR REGION: ICD-10-CM

## 2018-08-20 PROCEDURE — 97110 THERAPEUTIC EXERCISES: CPT | Performed by: PHYSICAL THERAPIST

## 2018-08-20 PROCEDURE — G8978 MOBILITY CURRENT STATUS: HCPCS | Performed by: PHYSICAL THERAPIST

## 2018-08-20 PROCEDURE — 97535 SELF CARE MNGMENT TRAINING: CPT | Performed by: PHYSICAL THERAPIST

## 2018-08-20 PROCEDURE — 97161 PT EVAL LOW COMPLEX 20 MIN: CPT | Performed by: PHYSICAL THERAPIST

## 2018-08-20 PROCEDURE — G8979 MOBILITY GOAL STATUS: HCPCS | Performed by: PHYSICAL THERAPIST

## 2018-08-20 NOTE — PROGRESS NOTES
PT Evaluation     Today's date: 2018  Patient name: Heidy Hu  : 1960  MRN: [id number]  Referring provider: Evin Fang  Dx:   Encounter Diagnosis     ICD-10-CM    1  Radiculopathy, lumbar region M54 16 Ambulatory referral to Physical Therapy                  Assessment  Functional limitations: pain with prolonged sitting (>30 min), pain with driving, pain with golfing, limited standing tolerance  Assessment details: Pt is a very active 60 yo male with diagnosis of lumbar radiculopathy presenting with decreased core stability, hypermobility of lumbar spine, decreased BLE flexibility, and limited functional activity tolerance in addition to the above functional limitations  He is an excellent candidate for outpatient PT to address the above limitations and optimize functional status  Understanding of Dx/Px/POC: good   Prognosis: good    Goals  STG - 3 weeks  1  Independent with HEP  2  Eliminate radicular pain below R knee  LTG - 4-6 weeks  1  Increase lower abdominal and B hip strength by one MMT grade to indicate improved core stability  2  Tolerate sitting/driving >69 minutes without onset of pain to allow resuming PLOF  3  Normalize BLE flexibility to promote good body mechanics during daily tasks  4  Return to golfing without onset of pain or stiffness to allow resuming PLOF      Plan  Patient would benefit from: skilled physical therapy  Planned modality interventions: unattended electrical stimulation and cryotherapy  Planned therapy interventions: manual therapy, strengthening, stretching, therapeutic activities, therapeutic exercise, home exercise program, patient education and postural training  Frequency: 2x week  Duration in weeks: 6  Plan of Care expiration date: 10/1/2018  Treatment plan discussed with: patient  Plan details: 2x/week x 4-6 weeks        Subjective Evaluation    History of Present Illness  Date of onset: 2018  Mechanism of injury: Pt reports in  of this year, he changed his footwear used for training for [employer] and began having R toe numbness  Over the following week, he began having progressively worse RLE symptoms including pain, numbness, tingling, and burning from his ischial tub to his great toe  He denies any imaging of his lumbar spine at this time but has had recurring back pain intermittently for years  He arrives with referral to outpatient PT today  PMH significant for R knee meniscus tear   Pain  Current pain rating: 3  At best pain ratin  At worst pain ratin  Quality: burning, dull ache and radiating  Relieving factors: medications  Aggravating factors: sitting, standing, walking and running  Progression: improved    Social Support  Lives with: spouse    Employment status: working (seated 75% of the time)  Exercise history: spartan races, mountain biking, running, cross fit, golfing      Diagnostic Tests  No diagnostic tests performed  Treatments  No previous or current treatments  Patient Goals  Patient goals for therapy: decreased pain, increased strength and return to sport/leisure activities          Objective     Active Range of Motion     Lumbar   Flexion: WFL  Extension: Active lumbar extension: area of hypermobility noted at L1  WFL  Left lateral flexion: WFL  Right lateral flexion: WFL  Left rotation: WFL  Right rotation: WFL    Passive Range of Motion     Additional Passive Range of Motion Details  Hamstrings B = WNL  Quads B = moderately restricted  Piriformis B = moderately restricted L>R    Strength/Myotome Testing     Additional Strength Details  Upper Abs = 5/5  Lower Abs = 3/5    Lower Extremity Strength    Left     Right  Hip Flex          5/5    5/5  Hip Ext  4/5    4/5   Hip Abd 5/5    5/5  Hip ER  4/5    4/5    Knee Flex 5/5    5/5  Knee Ext 5/5    5/5    Ankle DF 5/5    5/5  Ankle PF 5/5    5/5    Tests     Lumbar   Positive repeated extension       Additional Tests Details  Lumbar spine grossly hypermobile with P-A spring testing          Precautions: none    Daily Treatment Diary     Manual  8/20            Prone press ups with OP 5"x10                                                                    Exercise Diary  8/20            AB 5"x10            AB w/ bridge 5"x10            AB w/ bent knee fall out             AB w/ hip add             AB w/ hip abd             Piriformis str 30"            Prone quad str             Prone hip ext             Prone hip ext kb             Prone alt UE/LE                                                                                                                                                   Modalities  8/20            CP in prone prop 10m                                        **Provided and reviewed written HEP**

## 2018-08-23 ENCOUNTER — OFFICE VISIT (OUTPATIENT)
Dept: PHYSICAL THERAPY | Facility: CLINIC | Age: 58
End: 2018-08-23
Payer: COMMERCIAL

## 2018-08-23 DIAGNOSIS — M54.16 RADICULOPATHY, LUMBAR REGION: Primary | ICD-10-CM

## 2018-08-23 PROCEDURE — 97110 THERAPEUTIC EXERCISES: CPT | Performed by: PHYSICAL THERAPIST

## 2018-08-23 PROCEDURE — 97140 MANUAL THERAPY 1/> REGIONS: CPT | Performed by: PHYSICAL THERAPIST

## 2018-08-23 NOTE — PROGRESS NOTES
Daily Note     Today's date: 2018  Patient name: Xavier Hong  : 1960  MRN: [id number]  Referring provider: Steven Manning  Dx:   Encounter Diagnosis     ICD-10-CM    1  Radiculopathy, lumbar region M54 16                   Subjective: Pt reports feeling less pain in his RLE since last session and is doing his exercises  Objective: See treatment diary below      Assessment: Tolerated treatment well and demonstrates improved TA contraction    Patient would benefit from continued PT      Plan: Progress treatment as tolerated        Precautions: none    Daily Treatment Diary     Manual             Prone press ups with OP 5"x10 5"x10 x2                                                                   Exercise Diary             AB 5"x10 5"x20           AB w/ bridge 5"x10 x20           AB w/ bent knee fall out  x20           AB w/ hip add  5"x20           AB w/ hip abd  20x Blue           Piriformis str 30" 30"x3           Prone quad str  30"x3           Prone hip ext  20x           Prone hip ext kb  20x           Prone alt UE/LE  20x                                                                                                                                                 Modalities              CP in prone prop 10m

## 2018-08-27 ENCOUNTER — OFFICE VISIT (OUTPATIENT)
Dept: PHYSICAL THERAPY | Facility: CLINIC | Age: 58
End: 2018-08-27
Payer: COMMERCIAL

## 2018-08-27 DIAGNOSIS — M54.16 RADICULOPATHY, LUMBAR REGION: Primary | ICD-10-CM

## 2018-08-27 PROCEDURE — 97110 THERAPEUTIC EXERCISES: CPT | Performed by: PHYSICAL THERAPIST

## 2018-08-27 PROCEDURE — 97140 MANUAL THERAPY 1/> REGIONS: CPT | Performed by: PHYSICAL THERAPIST

## 2018-08-27 NOTE — PROGRESS NOTES
Daily Note     Today's date: 2018  Patient name: Sae Ernst  : 1960  MRN: [id number]  Referring provider: John Tompkins  Dx:   Encounter Diagnosis     ICD-10-CM    1  Radiculopathy, lumbar region M54 16                   Subjective: Pt reports he had a race this weekend and tried to make a point of tightening his abdominals while running  He thinks it helped  Objective: See treatment diary below      Assessment: Tolerated treatment well  Patient was somewhat fatigued with progression of program       Plan: Continue per plan of care       Precautions: none    Daily Treatment Diary     Manual            Prone press ups with OP 5"x10 5"x10 x2 5"x10                                                                  Exercise Diary            AB 5"x10 5"x20 5"x20          AB w/ bridge 5"x10 x20           AB w/ bent knee fall out  x20 x20          AB w/ hip add  5"x20 5"x20 w/bridge          AB w/ hip abd  20x Blue 20x black w/ bridge          Piriformis str 30" 30"x3 30"x3          Prone quad str  30"x3 30"x3          Prone hip ext  20x 20x3#          Prone hip ext kb  20x 20x3#          Prone alt UE/LE  20x 20x3#          Elliptical w/ AB   6'                                                                                                                                   Modalities              CP in prone prop 10m

## 2018-08-30 ENCOUNTER — OFFICE VISIT (OUTPATIENT)
Dept: PHYSICAL THERAPY | Facility: CLINIC | Age: 58
End: 2018-08-30
Payer: COMMERCIAL

## 2018-08-30 DIAGNOSIS — M54.16 RADICULOPATHY, LUMBAR REGION: Primary | ICD-10-CM

## 2018-08-30 PROCEDURE — 97110 THERAPEUTIC EXERCISES: CPT

## 2018-08-30 PROCEDURE — 97140 MANUAL THERAPY 1/> REGIONS: CPT

## 2018-08-30 NOTE — PROGRESS NOTES
Daily Note     Today's date: 2018  Patient name: Shala   : 1960  MRN: [id number]  Referring provider: Emmy Chan PA-C  Dx:   Encounter Diagnosis     ICD-10-CM    1  Radiculopathy, lumbar region M54 16                   Subjective: Pt reports his LB feels good, and R LE radicular symptoms have eased  Objective: See treatment diary below      Assessment: Muscle cramping at times during exercise program  Required minimal vc'ing during same  KT, PT performed manual therapy  Fatigue during and after tx, 2* pt feeling hot and perspiring  Will monitor  Plan: Continue per plan of care       Precautions: none    Daily Treatment Diary     Manual           Prone press ups with OP 5"x10 5"x10 x2 5"x10 5"x10                                                                 Exercise Diary           AB 5"x10 5"x20 5"x20 5"x20         AB w/ bridge 5"x10 x20           AB w/ bent knee fall out  x20 x20 x20         AB w/ hip add  5"x20 5"x20 w/bridge 5"x20  w/bridge         AB w/ hip abd  20x Blue 20x black w/ bridge 20x  Black  W/  bridge         Piriformis str 30" 30"x3 30"x3 30"x3         Prone quad str  30"x3 30"x3 30"x3         Prone hip ext  20x 20x3# 20x3#         Prone hip ext kb  20x 20x3# 20x3#         Prone alt UE/LE  20x 20x3# 20x3#         Elliptical w/ AB   6' 6'                                                                                                                                  Modalities  8/            CP in prone prop 10m

## 2018-09-04 ENCOUNTER — OFFICE VISIT (OUTPATIENT)
Dept: PHYSICAL THERAPY | Facility: CLINIC | Age: 58
End: 2018-09-04
Payer: COMMERCIAL

## 2018-09-04 DIAGNOSIS — M54.16 RADICULOPATHY, LUMBAR REGION: Primary | ICD-10-CM

## 2018-09-04 PROCEDURE — 97140 MANUAL THERAPY 1/> REGIONS: CPT | Performed by: PHYSICAL THERAPIST

## 2018-09-04 PROCEDURE — 97110 THERAPEUTIC EXERCISES: CPT | Performed by: PHYSICAL THERAPIST

## 2018-09-04 NOTE — PROGRESS NOTES
Daily Note     Today's date: 2018  Patient name: Nicolas Gale  : 1960  MRN: [id number]  Referring provider: Saintclair Roca  Dx:   Encounter Diagnosis     ICD-10-CM    1  Radiculopathy, lumbar region M54 16                   Subjective: Pt reports he's having more R knee/calf pain today after sitting most of the morning  Objective: See treatment diary below      Assessment: Tolerated treatment well and demonstrates good carryover  Patient would benefit from continued PT  RLE radicular pain significantly reduced with repeated lumbar extension in stance  Plan: Continue per plan of care         Precautions: none    Daily Treatment Diary     Manual          Prone press ups with OP 5"x10 5"x10 x2 5"x10 5"x10 5"x10        Long Axis Hip Distraction     30"B                                                   Exercise Diary          AB 5"x10 5"x20 5"x20 5"x20 5"x20        AB w/ bridge 5"x10 x20           AB w/ bent knee fall out  x20 x20 x20 x20        AB w/ hip add  5"x20 5"x20 w/bridge 5"x20  w/bridge 5"x20  w/bridge        AB w/ hip abd  20x Blue 20x black w/ bridge 20x  Black  W/  bridge 20x black w/ bridge        Piriformis str 30" 30"x3 30"x3 30"x3 30"x3        Prone quad str  30"x3 30"x3 30"x3 30"x3        Prone hip ext  20x 20x3# 20x3# 20x3#        Prone hip ext kb  20x 20x3# 20x3# 20x3#        Prone alt UE/LE  20x 20x3# 20x3# 20x3#        Elliptical w/ AB   6' 6' NV                     TREVOR     5"x5                                                                                                       Modalities          CP in prone prop 10m    10'

## 2018-09-07 ENCOUNTER — OFFICE VISIT (OUTPATIENT)
Dept: PHYSICAL THERAPY | Facility: CLINIC | Age: 58
End: 2018-09-07
Payer: COMMERCIAL

## 2018-09-07 DIAGNOSIS — M54.16 RADICULOPATHY, LUMBAR REGION: Primary | ICD-10-CM

## 2018-09-07 PROCEDURE — 97110 THERAPEUTIC EXERCISES: CPT

## 2018-09-07 PROCEDURE — 97140 MANUAL THERAPY 1/> REGIONS: CPT

## 2018-09-07 NOTE — PROGRESS NOTES
Daily Note     Today's date: 2018  Patient name: Barb Chiu  : 1960  MRN: [id number]  Referring provider: Niraj Rico PA-C  Dx:   Encounter Diagnosis     ICD-10-CM    1  Radiculopathy, lumbar region M54 16                   Subjective: Pt reports he felt sore on Wed  2* walking a lot, felt better after doing stretches when he got home  Notes he felt good yesterday and he's feeling good today  Reports driving bothers him the most    Objective: See treatment diary below      Assessment: Performed exercise program w/o complaint  Required minimal vc'ing during same  Concluded with CP x 10'  Will monitor  Patient would benefit from continued PT  Plan: Continue per plan of care         Precautions: none    Daily Treatment Diary     30"[id number]         Prone press ups with OP 5"x10 5"x10 x2 5"x10 5"x10 5"x10        Long Axis Hip Distraction     30"B                                                   Exercise Diary         AB 5"x10 5"x20 5"x20 5"x20 5"x20 5"x20       AB w/ bridge 5"x10 x20           AB w/ bent knee fall out  x20 x20 x20 x20 x20       AB w/ hip add  5"x20 5"x20 w/bridge 5"x20  w/bridge 5"x20  w/bridge 5'x20  w/bridge       AB w/ hip abd  20x Blue 20x black w/ bridge 20x  Black  W/  bridge 20x black w/ bridge 20x  Black  w/bridge       Piriformis str 30" 30"x3 30"x3 30"x3 30"x3 30"x3       Prone quad str  30"x3 30"x3 30"x3 30"x3 30"x3       Prone hip ext  20x 20x3# 20x3# 20x3# 20x3#       Prone hip ext kb  20x 20x3# 20x3# 20x3# 20x3#       Prone alt UE/LE  20x 20x3# 20x3# 20x3# 20x3#       Elliptical w/ AB   6' 6' NV 6'                    TREVOR     5"x5 5"x5                                                                                                      Modalities         CP in prone prop 10m    10' 10'

## 2018-09-12 RX ORDER — NAPROXEN SODIUM 220 MG
TABLET ORAL
COMMUNITY
Start: 2015-01-01 | End: 2020-03-17 | Stop reason: SDUPTHER

## 2018-09-13 ENCOUNTER — OFFICE VISIT (OUTPATIENT)
Dept: PHYSICAL THERAPY | Facility: CLINIC | Age: 58
End: 2018-09-13
Payer: COMMERCIAL

## 2018-09-13 ENCOUNTER — OFFICE VISIT (OUTPATIENT)
Dept: FAMILY MEDICINE CLINIC | Facility: CLINIC | Age: 58
End: 2018-09-13
Payer: COMMERCIAL

## 2018-09-13 VITALS
RESPIRATION RATE: 16 BRPM | HEART RATE: 74 BPM | TEMPERATURE: 97.8 F | DIASTOLIC BLOOD PRESSURE: 82 MMHG | SYSTOLIC BLOOD PRESSURE: 140 MMHG | WEIGHT: 213.6 LBS | HEIGHT: 74 IN | BODY MASS INDEX: 27.41 KG/M2

## 2018-09-13 DIAGNOSIS — M54.16 RADICULOPATHY, LUMBAR REGION: Primary | ICD-10-CM

## 2018-09-13 DIAGNOSIS — Z00.00 PHYSICAL EXAM: ICD-10-CM

## 2018-09-13 DIAGNOSIS — R35.1 NOCTURIA: ICD-10-CM

## 2018-09-13 DIAGNOSIS — Z23 NEED FOR INFLUENZA VACCINATION: Primary | ICD-10-CM

## 2018-09-13 LAB
SL AMB  POCT GLUCOSE, UA: NORMAL
SL AMB LEUKOCYTE ESTERASE,UA: NORMAL
SL AMB POCT BILIRUBIN,UA: NORMAL
SL AMB POCT BLOOD,UA: NORMAL
SL AMB POCT CLARITY,UA: CLEAR
SL AMB POCT COLOR,UA: YELLOW
SL AMB POCT KETONES,UA: NORMAL
SL AMB POCT NITRITE,UA: NORMAL
SL AMB POCT PH,UA: 5
SL AMB POCT SPECIFIC GRAVITY,UA: 1.01
SL AMB POCT URINE PROTEIN: NORMAL
SL AMB POCT UROBILINOGEN: 0.2

## 2018-09-13 PROCEDURE — 97110 THERAPEUTIC EXERCISES: CPT | Performed by: PHYSICAL THERAPIST

## 2018-09-13 PROCEDURE — 90682 RIV4 VACC RECOMBINANT DNA IM: CPT

## 2018-09-13 PROCEDURE — 81003 URINALYSIS AUTO W/O SCOPE: CPT | Performed by: FAMILY MEDICINE

## 2018-09-13 PROCEDURE — 99396 PREV VISIT EST AGE 40-64: CPT | Performed by: FAMILY MEDICINE

## 2018-09-13 NOTE — PROGRESS NOTES
FAMILY PRACTICE OFFICE VISIT       NAME: Juana Gardner  AGE: 61 y o  SEX: male       : 1960        MRN: [id number]    DATE: 2018  TIME: 11:05 AM    Assessment and Plan     Problem List Items Addressed This Visit     Physical exam    Relevant Orders    POCT urine dip auto non-scope (Completed)    TSH, 3rd generation    CBC    Comprehensive metabolic panel    Lipid panel      Other Visit Diagnoses     Need for influenza vaccination    -  Primary    Relevant Orders    influenza vaccine, [id number], quadrivalent, recombinant, PF, 0 5 mL, for patients 18 yr+ (FLUBLOK) (Completed)    Nocturia        Relevant Orders    PSA, Total Screen       Patient appears to be in stable health overall  He was given prescription to obtain blood work as ordered  He was given his annual flu vaccine today  His colonoscopy is up-to-date  There are no Patient Instructions on file for this visit  Chief Complaint     Chief Complaint   Patient presents with    Physical Exam     Pt is here for physical exam and blood work       History of Present Illness     Patient denies any recent illness  He had a colonoscopy 2017 and was given 5 year clearance  He has had several orthopedic issues with left shoulder labral tear, right knee pain, left elbow pain  He has seen orthopedist for various conditions but overall feels he is improved with less discomfort at this time  He did have ache cholecystectomy 2018  He still exercises on a regular basis participating in [employer]  He denies any chest pain or shortness of breath  He is a nonsmoker  Review of Systems   Review of Systems   Constitutional: Negative  HENT: Negative  Respiratory: Negative  Cardiovascular: Negative  Gastrointestinal: Negative  Genitourinary: Negative  Musculoskeletal:        As per HPI   Skin: Negative  Neurological: Negative  Psychiatric/Behavioral: Negative          Active Problem List Patient Active Problem List   Diagnosis    Attention deficit disorder    Gastroesophageal reflux disease without esophagitis    Acute cholecystitis    Cholecystitis    Primary osteoarthritis of right knee    Physical exam       Past Medical History:  Past Medical History:   Diagnosis Date    Cholelithiasis        Past Surgical History:  Past Surgical History:   Procedure Laterality Date    APPENDECTOMY      CHOLANGIOGRAM N/A 1/31/2018    Procedure: CHOLANGIOGRAM;  Surgeon: Kelechi Cárdenas MD;  Location: [facility];  Service: General    CHOLECYSTECTOMY      CHOLECYSTECTOMY LAPAROSCOPIC N/A 1/31/2018    Procedure: CHOLECYSTECTOMY LAPAROSCOPIC;  Surgeon: Kelechi Cárdenas MD;  Location: [facility];  Service: General    KNEE ARTHROSCOPY      [place] COLONOSCOPY FLX DX W/COLLJ SPEC WHEN PFRMD N/A 7/19/2017    Procedure: COLONOSCOPY;  Surgeon: Canelo Lemons MD;  Location: [facility]; Service: Colorectal    ROTATOR CUFF REPAIR         Family History:  Family History   Problem Relation Age of Onset    Cancer Mother     Heart disease Father        Social History:  Social History     Social History    Marital status: /Civil Union     Spouse name: N/A    Number of children: N/A    Years of education: N/A     Occupational History    Not on file  Social History Main Topics    Smoking status: Never Smoker    Smokeless tobacco: Never Used    Alcohol use Yes      Comment: socially    Drug use: No    Sexual activity: Not on file     Other Topics Concern    Not on file     Social History Narrative    No narrative on file       Objective     Vitals:    09/13/18 0932   BP: 140/82   Pulse: 74   Resp: 16   Temp: 97 8 °F (36 6 °C)     Wt Readings from Last 3 Encounters:   09/13/18 96 9 kg (213 lb 9 6 oz)   07/23/18 95 3 kg (210 lb)   06/28/18 93 4 kg (206 lb)       Physical Exam   Constitutional: No distress  HENT:   Mouth/Throat: Oropharynx is clear and moist  No oropharyngeal exudate  Tympanic membranes within normal limits bilaterally   Neck:   No carotid bruits   Cardiovascular:   Regular rate and rhythm with no murmurs   Pulmonary/Chest:   Lungs are clear to auscultation without wheezes,rales, or rhonchi   Abdominal:   Abdomen is soft, nontender with positive bowel sounds  There is no rebound or guarding  No masses palpated   Musculoskeletal: He exhibits no edema  Lymphadenopathy:     He has no cervical adenopathy  Skin: No rash noted         Pertinent Laboratory/Diagnostic Studies:  Lab Results   Component Value Date    BUN 16 02/01/2018    CREATININE 1 09 02/01/2018    CALCIUM 8 5 02/01/2018     (L) 02/01/2018    K 4 5 02/01/2018    CO2 25 02/01/2018     02/01/2018     Lab Results   Component Value Date    ALT 33 02/01/2018    AST 32 02/01/2018    ALKPHOS 44 (L) 02/01/2018    BILITOT 1 3 (H) 12/02/2016       Lab Results   Component Value Date    WBC 12 12 (H) 02/01/2018    HGB 11 0 (L) 02/01/2018    HCT 31 6 (L) 02/01/2018    MCV 88 02/01/2018     02/01/2018       No results found for: TSH    Lab Results   Component Value Date    CHOL 204 (H) 12/02/2016     Lab Results   Component Value Date    TRIG 42 12/02/2016     Lab Results   Component Value Date    HDL 68 12/02/2016     No results found for: LDLCALC  No results found for: HGBA1C    Results for orders placed or performed in visit on 09/13/18   POCT urine dip auto non-scope   Result Value Ref Range     COLOR,UA yellow      CLARITY,UA clear     SPECIFIC GRAVITY,UA 1 010      PH,UA 5 0     LEUKOCYTE ESTERASE,UA -     NITRITE,UA -     GLUCOSE, UA -     KETONES,UA -      BILIRUBIN,UA -      BLOOD,UA -     SL AMB POCT URINE PROTEIN -     SL AMB POCT UROBILINOGEN 0 2        Orders Placed This Encounter   Procedures    influenza vaccine, [id number], quadrivalent, recombinant, PF, 0 5 mL, for patients 18 yr+ (FLUBLOK)    PSA, Total Screen    TSH, 3rd generation    CBC    Comprehensive metabolic panel    Lipid panel  POCT urine dip auto non-scope       ALLERGIES:  Allergies   Allergen Reactions    Other      Benzoin       Current Medications     Current Outpatient Prescriptions   Medication Sig Dispense Refill    fluticasone (FLONASE) 50 mcg/act nasal spray 2 sprays into each nostril daily      multivitamin (THERAGRAN) TABS Take 1 tablet by mouth daily      naproxen sodium (ALEVE) 220 MG tablet       Omega-3 Fatty Acids (FISH OIL) 500 MG CAPS Take by mouth daily       No current facility-administered medications for this visit            Health Maintenance     Health Maintenance   Topic Date Due    INFLUENZA VACCINE  09/01/2018    PT PLAN OF CARE  09/19/2018    DTaP,Tdap,and Td Vaccines (1 - Tdap) 09/12/2019 (Originally 11/13/1980)    Depression Screening PHQ  08/20/2019    CRC Screening: Colonoscopy  07/19/2027     Immunization History   Administered Date(s) Administered    Influenza TIV (IM) 10/16/2016    Influenza, recombinant, quadrivalent,injectable, preservative free 91/06/6105       Myke Saavedra MD

## 2018-09-13 NOTE — PROGRESS NOTES
Daily Note     Today's date: 2018  Patient name: Laz Reddy  : 1960  MRN: [id number]  Referring provider: Andrea Lawson  Dx:   Encounter Diagnosis     ICD-10-CM    1  Radiculopathy, lumbar region M54 16                   Subjective: Pt reports he's doing very well and mostly able to manage his symptoms with the exercises  He has a big race this weekend but feels confident about it  Objective: See treatment diary below      Assessment: Tolerated treatment well  Patient exhibited good technique with therapeutic exercises      Plan: Continue per plan of care  Discharge next 1-2 visits      Precautions: none    Daily Treatment Diary     30"[id number]        Prone press ups with OP 5"x10 5"x10 x2 5"x10 5"x10 5"x10        Long Axis Hip Distraction     30"B                                                   Exercise Diary        AB 5"x10 5"x20 5"x20 5"x20 5"x20 5"x20 5"x20      AB w/ bridge 5"x10 x20           AB w/ bent knee fall out  x20 x20 x20 x20 x20       AB w/ hip add  5"x20 5"x20 w/bridge 5"x20  w/bridge 5"x20  w/bridge 5'x20  w/bridge 5"x20  w/bridge      AB w/ hip abd  20x Blue 20x black w/ bridge 20x  Black  W/  bridge 20x black w/ bridge 20x  Black  w/bridge 20x black w/ bridge      Piriformis str 30" 30"x3 30"x3 30"x3 30"x3 30"x3 30"x3      Prone quad str  30"x3 30"x3 30"x3 30"x3 30"x3 30"x3      Prone hip ext  20x 20x3# 20x3# 20x3# 20x3# 20x4#      Prone hip ext kb  20x 20x3# 20x3# 20x3# 20x3# 20x4#      Prone alt UE/LE  20x 20x3# 20x3# 20x3# 20x3# 20x4#      Elliptical w/ AB   6' 6' NV 6' 8'                   TREVOR     5"x5 5"x5 5"x5                                                                                                     Modalities  8/20    9/4 9/7       CP in prone prop 10m    10' 10'

## 2018-09-17 ENCOUNTER — APPOINTMENT (OUTPATIENT)
Dept: LAB | Facility: CLINIC | Age: 58
End: 2018-09-17
Payer: COMMERCIAL

## 2018-09-17 ENCOUNTER — OFFICE VISIT (OUTPATIENT)
Dept: PHYSICAL THERAPY | Facility: CLINIC | Age: 58
End: 2018-09-17
Payer: COMMERCIAL

## 2018-09-17 DIAGNOSIS — M54.16 RADICULOPATHY, LUMBAR REGION: Primary | ICD-10-CM

## 2018-09-17 DIAGNOSIS — Z00.00 PHYSICAL EXAM: ICD-10-CM

## 2018-09-17 DIAGNOSIS — R35.1 NOCTURIA: ICD-10-CM

## 2018-09-17 LAB
ALBUMIN SERPL BCP-MCNC: 4.1 G/DL (ref 3.5–5)
ALP SERPL-CCNC: 56 U/L (ref 46–116)
ALT SERPL W P-5'-P-CCNC: 55 U/L (ref 12–78)
ANION GAP SERPL CALCULATED.3IONS-SCNC: 7 MMOL/L (ref 4–13)
AST SERPL W P-5'-P-CCNC: 112 U/L (ref 5–45)
BILIRUB SERPL-MCNC: 1.19 MG/DL (ref 0.2–1)
BUN SERPL-MCNC: 16 MG/DL (ref 5–25)
CALCIUM SERPL-MCNC: 9.1 MG/DL (ref 8.3–10.1)
CHLORIDE SERPL-SCNC: 102 MMOL/L (ref 100–108)
CHOLEST SERPL-MCNC: 172 MG/DL (ref 50–200)
CO2 SERPL-SCNC: 27 MMOL/L (ref 21–32)
CREAT SERPL-MCNC: 1.33 MG/DL (ref 0.6–1.3)
ERYTHROCYTE [DISTWIDTH] IN BLOOD BY AUTOMATED COUNT: 13.3 % (ref 11.6–15.1)
GFR SERPL CREATININE-BSD FRML MDRD: 59 ML/MIN/1.73SQ M
GLUCOSE P FAST SERPL-MCNC: 83 MG/DL (ref 65–99)
HCT VFR BLD AUTO: 41 % (ref 36.5–49.3)
HDLC SERPL-MCNC: 72 MG/DL (ref 40–60)
HGB BLD-MCNC: 13.6 G/DL (ref 12–17)
LDLC SERPL CALC-MCNC: 88 MG/DL (ref 0–100)
MCH RBC QN AUTO: 30.6 PG (ref 26.8–34.3)
MCHC RBC AUTO-ENTMCNC: 33.2 G/DL (ref 31.4–37.4)
MCV RBC AUTO: 92 FL (ref 82–98)
NONHDLC SERPL-MCNC: 100 MG/DL
PLATELET # BLD AUTO: 218 THOUSANDS/UL (ref 149–390)
PMV BLD AUTO: 9.6 FL (ref 8.9–12.7)
POTASSIUM SERPL-SCNC: 4.4 MMOL/L (ref 3.5–5.3)
PROT SERPL-MCNC: 7.4 G/DL (ref 6.4–8.2)
PSA SERPL-MCNC: 1.3 NG/ML (ref 0–4)
RBC # BLD AUTO: 4.44 MILLION/UL (ref 3.88–5.62)
SODIUM SERPL-SCNC: 136 MMOL/L (ref 136–145)
TRIGL SERPL-MCNC: 58 MG/DL
TSH SERPL DL<=0.05 MIU/L-ACNC: 2.35 UIU/ML (ref 0.36–3.74)
WBC # BLD AUTO: 4.73 THOUSAND/UL (ref 4.31–10.16)

## 2018-09-17 PROCEDURE — G8980 MOBILITY D/C STATUS: HCPCS | Performed by: PHYSICAL THERAPIST

## 2018-09-17 PROCEDURE — G8979 MOBILITY GOAL STATUS: HCPCS | Performed by: PHYSICAL THERAPIST

## 2018-09-17 PROCEDURE — 80053 COMPREHEN METABOLIC PANEL: CPT

## 2018-09-17 PROCEDURE — 80061 LIPID PANEL: CPT

## 2018-09-17 PROCEDURE — 85027 COMPLETE CBC AUTOMATED: CPT

## 2018-09-17 PROCEDURE — 84443 ASSAY THYROID STIM HORMONE: CPT

## 2018-09-17 PROCEDURE — 36415 COLL VENOUS BLD VENIPUNCTURE: CPT

## 2018-09-17 PROCEDURE — G0103 PSA SCREENING: HCPCS

## 2018-09-17 PROCEDURE — 97110 THERAPEUTIC EXERCISES: CPT | Performed by: PHYSICAL THERAPIST

## 2018-09-17 NOTE — PROGRESS NOTES
Discharge Summary     Today's date: 2018  Patient name: Nicolas Gale  : 1960  MRN: [id number]  Referring provider: Saintclair Roca  Dx:   Encounter Diagnosis     ICD-10-CM    1  Radiculopathy, lumbar region M54 16                 Subjective: Pt reports he completed a [employer] race this weekend without difficulty due to his low back/radicular symptoms  He no longer has limited standing/walking tolerance and is able to participate in all recreational activities without limitation  He is independent with HEP and appropriate for discharge at this time  Objective: See treatment diary below      Assessment: Tolerated treatment well  Patient demonstrates normalized B hip strength and improved BLE flexibility  He is able to participate in very strenuous athletic events without pain or limitation and manage any symptoms that arise     Goals  STG - 3 weeks  1  Independent with HEP - MET  2  Eliminate radicular pain below R knee  - MET with HEP    LTG - 4-6 weeks  1  Increase lower abdominal and B hip strength by one MMT grade to indicate improved core stability  - MET  2  Tolerate sitting/driving >50 minutes without onset of pain to allow resuming PLOF  - MET  3  Normalize BLE flexibility to promote good body mechanics during daily tasks  - not yet met but progressing well  4   Return to golfing without onset of pain or stiffness to allow resuming PLOF  - MET    Plan: Discharge to [facility]    Precautions: none    Daily Treatment Diary     30"[id number]       Prone press ups with OP 5"x10 5"x10 x2 5"x10 5"x10 5"x10        Long Axis Hip Distraction     30"B                                                   Exercise Diary       AB 5"x10 5"x20 5"x20 5"x20 5"x20 5"x20 5"x20 5"x20     AB w/ bridge 5"x10 x20           AB w/ bent knee fall out  x20 x20 x20 x20 x20       AB w/ hip add  5"x20 5"x20 w/bridge 5"x20  w/bridge 5"x20  w/bridge 5'x20  w/bridge 5"x20  w/bridge 5"x20 w/ bridge     AB w/ hip abd  20x Blue 20x black w/ bridge 20x  Black  W/  bridge 20x black w/ bridge 20x  Black  w/bridge 20x black w/ bridge 20x Black w/ bridge     Piriformis str 30" 30"x3 30"x3 30"x3 30"x3 30"x3 30"x3 30"x3     Prone quad str  30"x3 30"x3 30"x3 30"x3 30"x3 30"x3 30"x3     Prone hip ext  20x 20x3# 20x3# 20x3# 20x3# 20x4# 20x5#     Prone hip ext kb  20x 20x3# 20x3# 20x3# 20x3# 20x4# 20x5#     Prone alt UE/LE  20x 20x3# 20x3# 20x3# 20x3# 20x4# 20x5#     Elliptical w/ AB   6' 6' NV 6' 8' 5'                  TREVOR     5"x5 5"x5 5"x5 5"x5                                                                                                    Modalities  8/20    9/4 9/7       CP in prone prop 10m    10' 10'

## 2018-09-20 ENCOUNTER — APPOINTMENT (OUTPATIENT)
Dept: PHYSICAL THERAPY | Facility: CLINIC | Age: 58
End: 2018-09-20
Payer: COMMERCIAL

## 2018-09-21 ENCOUNTER — TELEPHONE (OUTPATIENT)
Dept: FAMILY MEDICINE CLINIC | Facility: CLINIC | Age: 58
End: 2018-09-21

## 2018-09-21 DIAGNOSIS — R74.8 ELEVATED LIVER ENZYMES: Primary | ICD-10-CM

## 2018-09-21 NOTE — TELEPHONE ENCOUNTER
----- Message from Marlyn Watson MD sent at 4/88/8643  9:30 AM EDT -----  Recent blood work showed slight elevation in 1 liver enzyme test   I would repeat test in 4 weeks to re-evaluate    If elevation persist he may require ultrasound test   I will print out prescription to recheck blood work

## 2018-10-25 ENCOUNTER — APPOINTMENT (OUTPATIENT)
Dept: LAB | Facility: CLINIC | Age: 58
End: 2018-10-25
Payer: COMMERCIAL

## 2018-10-25 DIAGNOSIS — R74.8 ELEVATED LIVER ENZYMES: ICD-10-CM

## 2018-10-25 LAB
ALBUMIN SERPL BCP-MCNC: 4.1 G/DL (ref 3.5–5)
ALP SERPL-CCNC: 57 U/L (ref 46–116)
ALT SERPL W P-5'-P-CCNC: 33 U/L (ref 12–78)
AST SERPL W P-5'-P-CCNC: 54 U/L (ref 5–45)
BILIRUB DIRECT SERPL-MCNC: 0.3 MG/DL (ref 0–0.2)
BILIRUB SERPL-MCNC: 1.14 MG/DL (ref 0.2–1)
PROT SERPL-MCNC: 7.3 G/DL (ref 6.4–8.2)

## 2018-10-25 PROCEDURE — 80076 HEPATIC FUNCTION PANEL: CPT

## 2018-10-25 PROCEDURE — 36415 COLL VENOUS BLD VENIPUNCTURE: CPT

## 2018-10-31 ENCOUNTER — TELEPHONE (OUTPATIENT)
Dept: FAMILY MEDICINE CLINIC | Facility: CLINIC | Age: 58
End: 2018-10-31

## 2018-10-31 NOTE — TELEPHONE ENCOUNTER
----- Message from Ladan Mcnamara MD sent at 06/19/3008  8:18 AM EDT -----  Recent blood work shows much improvement on AST liver enzyme that was elevated last month    It is almost completely back to normal   No further workup is needed at this time

## 2019-09-16 ENCOUNTER — OFFICE VISIT (OUTPATIENT)
Dept: FAMILY MEDICINE CLINIC | Facility: CLINIC | Age: 59
End: 2019-09-16
Payer: COMMERCIAL

## 2019-09-16 VITALS
DIASTOLIC BLOOD PRESSURE: 90 MMHG | HEIGHT: 74 IN | BODY MASS INDEX: 27.25 KG/M2 | HEART RATE: 55 BPM | SYSTOLIC BLOOD PRESSURE: 130 MMHG | RESPIRATION RATE: 16 BRPM | OXYGEN SATURATION: 97 % | TEMPERATURE: 97 F | WEIGHT: 212.38 LBS

## 2019-09-16 DIAGNOSIS — Z00.00 PERIODIC HEALTH ASSESSMENT, GENERAL SCREENING, ADULT: ICD-10-CM

## 2019-09-16 DIAGNOSIS — Z00.00 PHYSICAL EXAM: ICD-10-CM

## 2019-09-16 DIAGNOSIS — Z23 FLU VACCINE NEED: Primary | ICD-10-CM

## 2019-09-16 LAB
SL AMB  POCT GLUCOSE, UA: NORMAL
SL AMB LEUKOCYTE ESTERASE,UA: NORMAL
SL AMB POCT BILIRUBIN,UA: NORMAL
SL AMB POCT BLOOD,UA: NORMAL
SL AMB POCT CLARITY,UA: CLEAR
SL AMB POCT COLOR,UA: YELLOW
SL AMB POCT KETONES,UA: NORMAL
SL AMB POCT NITRITE,UA: NORMAL
SL AMB POCT PH,UA: 7
SL AMB POCT SPECIFIC GRAVITY,UA: 1
SL AMB POCT URINE PROTEIN: NORMAL
SL AMB POCT UROBILINOGEN: 0.2

## 2019-09-16 PROCEDURE — 81003 URINALYSIS AUTO W/O SCOPE: CPT | Performed by: FAMILY MEDICINE

## 2019-09-16 PROCEDURE — 90686 IIV4 VACC NO PRSV 0.5 ML IM: CPT | Performed by: FAMILY MEDICINE

## 2019-09-16 PROCEDURE — 90471 IMMUNIZATION ADMIN: CPT | Performed by: FAMILY MEDICINE

## 2019-09-16 PROCEDURE — 99396 PREV VISIT EST AGE 40-64: CPT | Performed by: FAMILY MEDICINE

## 2019-09-16 NOTE — PROGRESS NOTES
FAMILY PRACTICE OFFICE VISIT       NAME: Arsenio Burgos  AGE: 60 y o  SEX: male       : 1960        MRN: [id number]    DATE: 2019  TIME: 9:33 AM    Assessment and Plan     Problem List Items Addressed This Visit        Other    Physical exam     Well adult  Patient appears to be overall stable health  He will obtain blood work as ordered  He received his annual flu vaccine today  His colonoscopy is up-to-date           Other Visit Diagnoses     Flu vaccine need    -  Primary    Periodic health assessment, general screening, adult        Relevant Orders    CBC    Comprehensive metabolic panel    Lipid panel    TSH, 3rd generation    PSA, Total Screen    POCT urine dip auto non-scope (Completed)              Chief Complaint     Chief Complaint   Patient presents with    Well Check       History of Present Illness     Patient denies any recent illness  He exercises on a regular bases participating in [place] [address]  He has had occasional shoulder injuries but currently feels well  His last colonoscopy was 2017  Review of Systems   Review of Systems   Constitutional: Negative  HENT: Negative  Eyes: Negative  Respiratory: Negative  Cardiovascular: Negative  Gastrointestinal: Negative  Genitourinary: Negative  Musculoskeletal:        As per HPI   Skin: Negative  Neurological: Negative  Hematological: Negative  Psychiatric/Behavioral: Negative          Active Problem List     Patient Active Problem List   Diagnosis    Attention deficit disorder    Gastroesophageal reflux disease without esophagitis    Acute cholecystitis    Cholecystitis    Primary osteoarthritis of right knee    Physical exam       Past Medical History:  Past Medical History:   Diagnosis Date    Cholelithiasis        Past Surgical History:  Past Surgical History:   Procedure Laterality Date    APPENDECTOMY      CHOLANGIOGRAM N/A 2018    Procedure: CHOLANGIOGRAM;  Surgeon: Riky Frye MD;  [facility];  Service: General    CHOLECYSTECTOMY      CHOLECYSTECTOMY LAPAROSCOPIC N/A 1/31/2018    Procedure: CHOLECYSTECTOMY LAPAROSCOPIC;  Surgeon: Riky Frye MD;  [facility];  Service: General    KNEE ARTHROSCOPY      MD COLONOSCOPY FLX DX W/COLLJ SPEC WHEN PFRMD N/A 7/19/2017    Procedure: COLONOSCOPY;  Surgeon: Kinjal Diallo MD;  [facility];   Service: Colorectal    ROTATOR CUFF REPAIR         Family History:  Family History   Problem Relation Age of Onset   [facility] Cancer Mother     Heart disease Father        Social History:  Social History     Socioeconomic History    Marital status: /Civil Union     Spouse name: Not on file    Number of children: Not on file    Years of education: Not on file    Highest education level: Not on file   Occupational History    Not on file   Social Needs    Financial resource strain: Not on file    Food insecurity:     Worry: Not on file     Inability: Not on file    Transportation needs:     Medical: Not on file     Non-medical: Not on file   Tobacco Use    Smoking status: Never Smoker    Smokeless tobacco: Never Used   Substance and Sexual Activity    Alcohol use: Yes     Comment: socially    Drug use: No    Sexual activity: Not on file   Lifestyle    Physical activity:     Days per week: Not on file     Minutes per session: Not on file    Stress: Not on file   Relationships    Social connections:     Talks on phone: Not on file     Gets together: Not on file     Attends Scientologist service: Not on file     Active member of club or organization: Not on file     Attends meetings of clubs or organizations: Not on file     Relationship status: Not on file    Intimate partner violence:     Fear of current or ex partner: Not on file     Emotionally abused: Not on file     Physically abused: Not on file     Forced sexual activity: Not on file   Other Topics Concern    Not on file   Social History Narrative    Not on file       Objective     Vitals:    09/16/19 0909   BP: 130/90   Pulse: 55   Resp: 16   Temp: (!) 97 °F (36 1 °C)   SpO2: 97%     Wt Readings from Last 3 Encounters:   09/16/19 96 3 kg (212 lb 6 oz)   09/13/18 96 9 kg (213 lb 9 6 oz)   07/23/18 95 3 kg (210 lb)       Physical Exam   Constitutional: He is oriented to person, place, and time  He appears well-developed and well-nourished  HENT:   Right Ear: External ear normal    Left Ear: External ear normal    Nose: Nose normal    Mouth/Throat: Oropharynx is clear and moist    Tympanic membranes normal  Pharynx clear   Eyes: Pupils are equal, round, and reactive to light  Conjunctivae and EOM are normal    Neck: Normal range of motion  Neck supple  No thyromegaly present  Cardiovascular: Normal rate, regular rhythm and normal heart sounds  No murmur heard  Pulmonary/Chest: Effort normal and breath sounds normal    Abdominal: Soft  Bowel sounds are normal  There is no tenderness  NO hepatospenomegaly   Musculoskeletal: Normal range of motion  He exhibits no edema  Lymphadenopathy:     He has no cervical adenopathy  Neurological: He is alert and oriented to person, place, and time  Skin:   NO RASHES   Psychiatric: He has a normal mood and affect  Nursing note and vitals reviewed        Pertinent Laboratory/Diagnostic Studies:  Lab Results   Component Value Date    BUN 16 09/17/2018    CREATININE 1 33 (H) 09/17/2018    CALCIUM 9 1 09/17/2018     12/02/2016    K 4 4 09/17/2018    CO2 27 09/17/2018     09/17/2018     Lab Results   Component Value Date    ALT 33 10/25/2018    AST 54 (H) 10/25/2018    ALKPHOS 57 10/25/2018    BILITOT 1 3 (H) 12/02/2016       Lab Results   Component Value Date    WBC 4 73 09/17/2018    HGB 13 6 09/17/2018    HCT 41 0 09/17/2018    MCV 92 09/17/2018     09/17/2018       No results found for: TSH    Lab Results   Component Value Date    CHOL 204 (H) 12/02/2016     Lab Results Component Value Date    TRIG 58 09/17/2018     Lab Results   Component Value Date    HDL 72 (H) 09/17/2018     Lab Results   Component Value Date    LDLCALC 88 09/17/2018     No results found for: HGBA1C    Results for orders placed or performed in visit on 09/16/19   POCT urine dip auto non-scope   Result Value Ref Range     COLOR,UA yellow     CLARITY,UA clear     SPECIFIC GRAVITY,UA 1 000      PH,UA 7 0     LEUKOCYTE ESTERASE,UA neg     NITRITE,UA neg     GLUCOSE, UA neg     KETONES,UA neg     BILIRUBIN,UA neg     BLOOD,UA neg     POCT URINE PROTEIN neg     SL AMB POCT UROBILINOGEN 0 2        Orders Placed This Encounter   Procedures    CBC    Comprehensive metabolic panel    Lipid panel    TSH, 3rd generation    PSA, Total Screen    POCT urine dip auto non-scope       ALLERGIES:  Allergies   Allergen Reactions    Other      Benzoin       Current Medications     Current Outpatient Medications   Medication Sig Dispense Refill    fluticasone (FLONASE) 50 mcg/act nasal spray 2 sprays into each nostril daily      multivitamin (THERAGRAN) TABS Take 1 tablet by mouth daily      naproxen sodium (ALEVE) 220 MG tablet       Omega-3 Fatty Acids (FISH OIL) 500 MG CAPS Take by mouth daily       No current facility-administered medications for this visit            Health Maintenance     Health Maintenance   Topic Date Due    Hepatitis C Screening  1960    BMI: Followup Plan  11/13/1977    BMI: Adult  11/13/1977    DTaP,Tdap,and Td Vaccines (1 - Tdap) 11/13/1980    PT PLAN OF CARE  10/17/2018    INFLUENZA VACCINE  07/01/2019    Depression Screening PHQ  09/16/2020    Pneumococcal Vaccine: 65+ Years (1 of 2 - PCV13) 11/13/2024    CRC Screening: Colonoscopy  07/19/2027    Pneumococcal Vaccine: Pediatrics (0 to 5 Years) and At-Risk Patients (6 to 59 Years)  Aged Out    HEPATITIS B VACCINES  Aged [employer] History   Administered Date(s) Administered    Influenza TIV (IM) 10/16/2016    Influenza, recombinant, quadrivalent,injectable, preservative free 44/23/2425       Kayley Duque MD

## 2019-09-16 NOTE — ASSESSMENT & PLAN NOTE
Well adult  Patient appears to be overall stable health  He will obtain blood work as ordered  He received his annual flu vaccine today    His colonoscopy is up-to-date

## 2019-09-19 ENCOUNTER — APPOINTMENT (OUTPATIENT)
Dept: LAB | Facility: CLINIC | Age: 59
End: 2019-09-19
Payer: COMMERCIAL

## 2019-09-19 DIAGNOSIS — Z00.00 PERIODIC HEALTH ASSESSMENT, GENERAL SCREENING, ADULT: ICD-10-CM

## 2019-09-19 LAB
ALBUMIN SERPL BCP-MCNC: 4 G/DL (ref 3.5–5)
ALP SERPL-CCNC: 62 U/L (ref 46–116)
ALT SERPL W P-5'-P-CCNC: 35 U/L (ref 12–78)
ANION GAP SERPL CALCULATED.3IONS-SCNC: 5 MMOL/L (ref 4–13)
AST SERPL W P-5'-P-CCNC: 34 U/L (ref 5–45)
BILIRUB SERPL-MCNC: 0.62 MG/DL (ref 0.2–1)
BUN SERPL-MCNC: 14 MG/DL (ref 5–25)
CALCIUM SERPL-MCNC: 9.2 MG/DL (ref 8.3–10.1)
CHLORIDE SERPL-SCNC: 100 MMOL/L (ref 100–108)
CHOLEST SERPL-MCNC: 189 MG/DL (ref 50–200)
CO2 SERPL-SCNC: 28 MMOL/L (ref 21–32)
CREAT SERPL-MCNC: 1.37 MG/DL (ref 0.6–1.3)
ERYTHROCYTE [DISTWIDTH] IN BLOOD BY AUTOMATED COUNT: 13.4 % (ref 11.6–15.1)
GFR SERPL CREATININE-BSD FRML MDRD: 56 ML/MIN/1.73SQ M
GLUCOSE P FAST SERPL-MCNC: 104 MG/DL (ref 65–99)
HCT VFR BLD AUTO: 45 % (ref 36.5–49.3)
HDLC SERPL-MCNC: 70 MG/DL (ref 40–60)
HGB BLD-MCNC: 14.9 G/DL (ref 12–17)
LDLC SERPL CALC-MCNC: 108 MG/DL (ref 0–100)
MCH RBC QN AUTO: 30.8 PG (ref 26.8–34.3)
MCHC RBC AUTO-ENTMCNC: 33.1 G/DL (ref 31.4–37.4)
MCV RBC AUTO: 93 FL (ref 82–98)
NONHDLC SERPL-MCNC: 119 MG/DL
PLATELET # BLD AUTO: 221 THOUSANDS/UL (ref 149–390)
PMV BLD AUTO: 9.5 FL (ref 8.9–12.7)
POTASSIUM SERPL-SCNC: 4.5 MMOL/L (ref 3.5–5.3)
PROT SERPL-MCNC: 7.4 G/DL (ref 6.4–8.2)
PSA SERPL-MCNC: 1.1 NG/ML (ref 0–4)
RBC # BLD AUTO: 4.84 MILLION/UL (ref 3.88–5.62)
SODIUM SERPL-SCNC: 133 MMOL/L (ref 136–145)
TRIGL SERPL-MCNC: 53 MG/DL
TSH SERPL DL<=0.05 MIU/L-ACNC: 2.58 UIU/ML (ref 0.36–3.74)
WBC # BLD AUTO: 3.89 THOUSAND/UL (ref 4.31–10.16)

## 2019-09-19 PROCEDURE — 84443 ASSAY THYROID STIM HORMONE: CPT

## 2019-09-19 PROCEDURE — 80061 LIPID PANEL: CPT

## 2019-09-19 PROCEDURE — 36415 COLL VENOUS BLD VENIPUNCTURE: CPT

## 2019-09-19 PROCEDURE — 80053 COMPREHEN METABOLIC PANEL: CPT

## 2019-09-19 PROCEDURE — 85027 COMPLETE CBC AUTOMATED: CPT

## 2019-09-19 PROCEDURE — G0103 PSA SCREENING: HCPCS

## 2019-12-02 ENCOUNTER — OFFICE VISIT (OUTPATIENT)
Dept: FAMILY MEDICINE CLINIC | Facility: CLINIC | Age: 59
End: 2019-12-02
Payer: COMMERCIAL

## 2019-12-02 VITALS
OXYGEN SATURATION: 98 % | WEIGHT: 220.2 LBS | TEMPERATURE: 97.3 F | DIASTOLIC BLOOD PRESSURE: 80 MMHG | HEIGHT: 74 IN | SYSTOLIC BLOOD PRESSURE: 130 MMHG | RESPIRATION RATE: 16 BRPM | BODY MASS INDEX: 28.26 KG/M2 | HEART RATE: 71 BPM

## 2019-12-02 DIAGNOSIS — L03.90 CELLULITIS, UNSPECIFIED CELLULITIS SITE: Primary | ICD-10-CM

## 2019-12-02 PROCEDURE — 99213 OFFICE O/P EST LOW 20 MIN: CPT | Performed by: FAMILY MEDICINE

## 2019-12-02 PROCEDURE — 3008F BODY MASS INDEX DOCD: CPT | Performed by: FAMILY MEDICINE

## 2019-12-02 PROCEDURE — 1036F TOBACCO NON-USER: CPT | Performed by: FAMILY MEDICINE

## 2019-12-02 RX ORDER — CEPHALEXIN 500 MG/1
500 CAPSULE ORAL EVERY 6 HOURS SCHEDULED
Qty: 40 CAPSULE | Refills: 0 | Status: SHIPPED | OUTPATIENT
Start: 2019-12-02 | End: 2019-12-12

## 2019-12-02 RX ORDER — CEPHALEXIN 500 MG/1
CAPSULE ORAL
Refills: 0 | COMMUNITY
Start: 2019-11-23 | End: 2020-03-17 | Stop reason: SDUPTHER

## 2019-12-02 NOTE — PROGRESS NOTES
FAMILY PRACTICE OFFICE VISIT       NAME: Brigette Marshall  AGE: 60 y o  SEX: male       : 1960        MRN: [id number]    DATE: 12/3/2019  TIME: 9:51 AM    Assessment and Plan     Problem List Items Addressed This Visit        Other    Cellulitis - Primary     Cellulitis  Patient was given prescription to restart cephalexin 500 mg q i d  For 10 days  He will obtain x-ray of left foot for further evaluation  He will also obtain blood work to check uric acid level however it seems unlikely that symptoms are related to gout  We did discuss the possibility of osteomyelitis and the need to follow up with formal podiatry consultation if symptoms do not improve significantly         Relevant Medications    cephalexin (KEFLEX) 500 mg capsule    Other Relevant Orders    CBC    Sedimentation rate, automated    Uric acid    XR foot 3+ vw left (Completed)              Chief Complaint     Chief Complaint   Patient presents with    Toe Pain     Pt is here for left foot 2nd toe infection 2 + wks       History of Present Illness     Patient states approximately 2 weeks ago he noticed acute onset of swelling of his left 2nd toe  He denies significant discomfort and had no history of trauma  He subsequently spoke with a friend of his who is a podiatrist who placed the patient on cephalexin 500 mg q i d   Initially some of the swelling came down however patient continues to have redness and drainage of pus from his toe  He states his pain level is a 2 on a 1-10 scale  He is able to ambulate on his feet  He denies any fevers  Review of Systems   Review of Systems   Constitutional: Negative      Musculoskeletal:        As per HPI   Skin:        As per HPI       Active Problem List     Patient Active Problem List   Diagnosis    Attention deficit disorder    Gastroesophageal reflux disease without esophagitis    Acute cholecystitis    Cholecystitis    Primary osteoarthritis of right knee    Physical exam  Cellulitis       Past Medical History:  Past Medical History:   Diagnosis Date    Cholelithiasis        Past Surgical History:  Past Surgical History:   Procedure Laterality Date    APPENDECTOMY      CHOLANGIOGRAM N/A 1/31/2018    Procedure: CHOLANGIOGRAM;  Surgeon: Misael Rothman MD;  [facility];  Service: General    CHOLECYSTECTOMY      CHOLECYSTECTOMY LAPAROSCOPIC N/A 1/31/2018    Procedure: CHOLECYSTECTOMY LAPAROSCOPIC;  Surgeon: Misael Rothman MD;  [facility];  Service: General    KNEE ARTHROSCOPY      NM COLONOSCOPY FLX DX W/COLLJ SPEC WHEN PFRMD N/A 7/19/2017    Procedure: COLONOSCOPY;  Surgeon: Wali Tilley MD;  [facility];   Service: Colorectal    ROTATOR CUFF REPAIR         Family History:  Family History   Problem Relation Age of Onset   [facility] Mother     Heart disease Father        Social History:  Social History     Socioeconomic History    Marital status: /Civil Union     Spouse name: Not on file    Number of children: Not on file    Years of education: Not on file    Highest education level: Not on file   Occupational History    Not on file   Social Needs    Financial resource strain: Not on file    Food insecurity:     Worry: Not on file     Inability: Not on file    Transportation needs:     Medical: Not on file     Non-medical: Not on file   Tobacco Use    Smoking status: Never Smoker    Smokeless tobacco: Never Used   Substance and Sexual Activity    Alcohol use: Yes     Comment: socially    Drug use: No    Sexual activity: Not on file   Lifestyle    Physical activity:     Days per week: Not on file     Minutes per session: Not on file    Stress: Not on file   Relationships    Social connections:     Talks on phone: Not on file     Gets together: Not on file     Attends Tenriism service: Not on file     Active member of club or organization: Not on file     Attends meetings of clubs or organizations: Not on file Relationship status: Not on file    Intimate partner violence:     Fear of current or ex partner: Not on file     Emotionally abused: Not on file     Physically abused: Not on file     Forced sexual activity: Not on file   Other Topics Concern    Not on file   Social History Narrative    Not on file       Objective     Vitals:    12/02/19 1420   BP: 130/80   Pulse: 71   Resp: 16   Temp: (!) 97 3 °F (36 3 °C)   SpO2: 98%     Wt Readings from Last 3 Encounters:   12/02/19 99 9 kg (220 lb 3 2 oz)   09/16/19 96 3 kg (212 lb 6 oz)   09/13/18 96 9 kg (213 lb 9 6 oz)       Physical Exam   Constitutional: No distress  Musculoskeletal:   Patient's left 2nd toe has significant redness with 1 open area draining yellowish pus  There is minimal tenderness to palpation     Patient has limited movement due to swelling  There is no lymphatic streaking of foot or lower extremity    Redness is limited to toe but does not extend into dorsum of foot       Pertinent Laboratory/Diagnostic Studies:  Lab Results   Component Value Date    BUN 14 09/19/2019    CREATININE 1 37 (H) 09/19/2019    CALCIUM 9 2 09/19/2019     12/02/2016    K 4 5 09/19/2019    CO2 28 09/19/2019     09/19/2019     Lab Results   Component Value Date    ALT 35 09/19/2019    AST 34 09/19/2019    ALKPHOS 62 09/19/2019    BILITOT 1 3 (H) 12/02/2016       Lab Results   Component Value Date    WBC 3 89 (L) 09/19/2019    HGB 14 9 09/19/2019    HCT 45 0 09/19/2019    MCV 93 09/19/2019     09/19/2019       No results found for: TSH    Lab Results   Component Value Date    CHOL 204 (H) 12/02/2016     Lab Results   Component Value Date    TRIG 53 09/19/2019     Lab Results   Component Value Date    HDL 70 (H) 09/19/2019     Lab Results   Component Value Date    LDLCALC 108 (H) 09/19/2019     No results found for: HGBA1C    Results for orders placed or performed in visit on 09/19/19   CBC   Result Value Ref Range    WBC 3 89 (L) 4 31 - 10 16 Thousand/uL RBC 4 84 3 88 - 5 62 Million/uL    Hemoglobin 14 9 12 0 - 17 0 g/dL    Hematocrit 45 0 36 5 - 49 3 %    MCV 93 82 - 98 fL    MCH 30 8 26 8 - 34 3 pg    MCHC 33 1 31 4 - 37 4 g/dL    RDW 13 4 11 6 - 15 1 %    Platelets 846 694 - 410 Thousands/uL    MPV 9 5 8 9 - 12 7 fL   Comprehensive metabolic panel   Result Value Ref Range    Sodium 133 (L) 136 - 145 mmol/L    Potassium 4 5 3 5 - 5 3 mmol/L    Chloride 100 100 - 108 mmol/L    CO2 28 21 - 32 mmol/L    ANION GAP 5 4 - 13 mmol/L    BUN 14 5 - 25 mg/dL    Creatinine 1 37 (H) 0 60 - 1 30 mg/dL    Glucose, Fasting 104 (H) 65 - 99 mg/dL    Calcium 9 2 8 3 - 10 1 mg/dL    AST 34 5 - 45 U/L    ALT 35 12 - 78 U/L    Alkaline Phosphatase 62 46 - 116 U/L    Total Protein 7 4 6 4 - 8 2 g/dL    Albumin 4 0 3 5 - 5 0 g/dL    Total Bilirubin 0 62 0 20 - 1 00 mg/dL    eGFR 56 ml/min/1 73sq m   Lipid panel   Result Value Ref Range    Cholesterol 189 50 - 200 mg/dL    Triglycerides 53 <=150 mg/dL    HDL, Direct 70 (H) 40 - 60 mg/dL    LDL Calculated 108 (H) 0 - 100 mg/dL    Non-HDL-Chol (CHOL-HDL) 119 mg/dl   TSH, 3rd generation   Result Value Ref Range    TSH 3RD GENERATON 2 580 0 358 - 3 740 uIU/mL   PSA, Total Screen   Result Value Ref Range    PSA 1 1 0 0 - 4 0 ng/mL       Orders Placed This Encounter   Procedures    XR foot 3+ vw left    CBC    Sedimentation rate, automated    Uric acid       ALLERGIES:  Allergies   Allergen Reactions    Other Rash     Benzoin       Current Medications     Current Outpatient Medications   Medication Sig Dispense Refill    cephalexin (KEFLEX) 500 mg capsule take 1 capsule by mouth three times a day for 7 days  0    fluticasone (FLONASE) 50 mcg/act nasal spray 2 sprays into each nostril daily      multivitamin (THERAGRAN) TABS Take 1 tablet by mouth daily      naproxen sodium (ALEVE) 220 MG tablet       Omega-3 Fatty Acids (FISH OIL) 500 MG CAPS Take by mouth daily      cephalexin (KEFLEX) 500 mg capsule Take 1 capsule (500 mg total) by mouth every 6 (six) hours for 10 days 40 capsule 0     No current facility-administered medications for this visit            Health Maintenance     Health Maintenance   Topic Date Due    Hepatitis C Screening  1960    DTaP,Tdap,and Td Vaccines (1 - Tdap) 11/13/1970    HIV Screening  11/13/1974    BMI: Followup Plan  11/13/1977    PT PLAN OF CARE  10/17/2018    Depression Screening PHQ  09/16/2020    BMI: Adult  12/02/2020    Pneumococcal Vaccine: 65+ Years (1 of 2 - PCV13) 11/13/2024    CRC Screening: Colonoscopy  07/19/2027    Influenza Vaccine  Completed    Pneumococcal Vaccine: Pediatrics (0 to 5 Years) and At-Risk Patients (6 to 59 Years)  Aged Out    HIB Vaccine  Aged Out    Hepatitis B Vaccine  Aged Out    IPV Vaccine  Aged Out    Hepatitis A Vaccine  Aged Out    Meningococcal ACWY Vaccine  Aged Out    HPV Vaccine  Aged [employer] History   Administered Date(s) Administered    Influenza TIV (IM) 10/16/2016    Influenza, injectable, quadrivalent, preservative free 0 5 mL 09/16/2019    Influenza, recombinant, quadrivalent,injectable, preservative free 00/54/4396       Sada Madden MD

## 2019-12-03 ENCOUNTER — APPOINTMENT (OUTPATIENT)
Dept: RADIOLOGY | Facility: CLINIC | Age: 59
End: 2019-12-03
Payer: COMMERCIAL

## 2019-12-03 ENCOUNTER — APPOINTMENT (OUTPATIENT)
Dept: LAB | Facility: CLINIC | Age: 59
End: 2019-12-03
Payer: COMMERCIAL

## 2019-12-03 ENCOUNTER — TELEPHONE (OUTPATIENT)
Dept: FAMILY MEDICINE CLINIC | Facility: CLINIC | Age: 59
End: 2019-12-03

## 2019-12-03 DIAGNOSIS — L03.90 CELLULITIS, UNSPECIFIED CELLULITIS SITE: ICD-10-CM

## 2019-12-03 LAB
ERYTHROCYTE [DISTWIDTH] IN BLOOD BY AUTOMATED COUNT: 13.2 % (ref 11.6–15.1)
ERYTHROCYTE [SEDIMENTATION RATE] IN BLOOD: 3 MM/HOUR (ref 0–10)
HCT VFR BLD AUTO: 47.6 % (ref 36.5–49.3)
HGB BLD-MCNC: 15.7 G/DL (ref 12–17)
MCH RBC QN AUTO: 30.5 PG (ref 26.8–34.3)
MCHC RBC AUTO-ENTMCNC: 33 G/DL (ref 31.4–37.4)
MCV RBC AUTO: 92 FL (ref 82–98)
PLATELET # BLD AUTO: 226 THOUSANDS/UL (ref 149–390)
PMV BLD AUTO: 9.4 FL (ref 8.9–12.7)
RBC # BLD AUTO: 5.15 MILLION/UL (ref 3.88–5.62)
URATE SERPL-MCNC: 7.1 MG/DL (ref 4.2–8)
WBC # BLD AUTO: 4.72 THOUSAND/UL (ref 4.31–10.16)

## 2019-12-03 PROCEDURE — 73630 X-RAY EXAM OF FOOT: CPT

## 2019-12-03 PROCEDURE — 85652 RBC SED RATE AUTOMATED: CPT

## 2019-12-03 PROCEDURE — 85027 COMPLETE CBC AUTOMATED: CPT

## 2019-12-03 PROCEDURE — 84550 ASSAY OF BLOOD/URIC ACID: CPT

## 2019-12-03 PROCEDURE — 36415 COLL VENOUS BLD VENIPUNCTURE: CPT

## 2019-12-03 NOTE — TELEPHONE ENCOUNTER
----- Message from Juan Alberto Nguyen MD sent at 80/5/0128 10:01 AM EST -----  X-ray of foot did not show any bony abnormalities    Call office if symptoms do not improve after medication completed

## 2019-12-03 NOTE — ASSESSMENT & PLAN NOTE
Cellulitis  Patient was given prescription to restart cephalexin 500 mg q i d  For 10 days  He will obtain x-ray of left foot for further evaluation  He will also obtain blood work to check uric acid level however it seems unlikely that symptoms are related to gout    We did discuss the possibility of osteomyelitis and the need to follow up with formal podiatry consultation if symptoms do not improve significantly none

## 2020-03-17 ENCOUNTER — OFFICE VISIT (OUTPATIENT)
Dept: URGENT CARE | Facility: CLINIC | Age: 60
End: 2020-03-17
Payer: COMMERCIAL

## 2020-03-17 VITALS
HEIGHT: 74 IN | TEMPERATURE: 97.7 F | SYSTOLIC BLOOD PRESSURE: 108 MMHG | OXYGEN SATURATION: 97 % | BODY MASS INDEX: 27.59 KG/M2 | WEIGHT: 215 LBS | DIASTOLIC BLOOD PRESSURE: 78 MMHG | RESPIRATION RATE: 18 BRPM | HEART RATE: 64 BPM

## 2020-03-17 DIAGNOSIS — B34.9 VIRAL SYNDROME: Primary | ICD-10-CM

## 2020-03-17 PROCEDURE — S9083 URGENT CARE CENTER GLOBAL: HCPCS | Performed by: PHYSICIAN ASSISTANT

## 2020-03-17 PROCEDURE — G0382 LEV 3 HOSP TYPE B ED VISIT: HCPCS | Performed by: PHYSICIAN ASSISTANT

## 2020-03-17 NOTE — PATIENT INSTRUCTIONS
Viral Syndrome   WHAT YOU NEED TO KNOW:   Viral syndrome is a term used for a viral infection that has no clear cause  Viruses are spread easily from person to person through the air and on shared items  DISCHARGE INSTRUCTIONS:   Call 911 for the following:   · You have a seizure  · You cannot be woken  · You have chest pain or trouble breathing  Return to the emergency department if:   · You have a stiff neck, a bad headache, and sensitivity to light  · You feel weak, dizzy, or confused  · You stop urinating or urinate a lot less than normal      · You cough up blood or thick, yellow or green, mucus  · You have severe abdominal pain or your abdomen is larger than usual   Contact your healthcare provider if:   · Your symptoms do not get better with treatment, or get worse, after 3 days  · You have a rash or ear pain  · You have burning when you urinate  · You have questions or concerns about your condition or care  Medicines: You may  need any of the following:  · Acetaminophen  decreases pain and fever  It is available without a doctor's order  Ask how much medicine to take and how often to take it  Follow directions  Acetaminophen can cause liver damage if not taken correctly  · NSAIDs , such as ibuprofen, help decrease swelling, pain, and fever  NSAIDs can cause stomach bleeding or kidney problems in certain people  If you take blood thinner medicine, always ask your healthcare provider if NSAIDs are safe for you  Always read the medicine label and follow directions  · Cold medicine  helps decrease swelling, control a cough, and relieve chest or nasal congestion  · Saline nasal spray  helps decrease nasal congestion  · Take your medicine as directed  Contact your healthcare provider if you think your medicine is not helping or if you have side effects  Tell him of her if you are allergic to any medicine   Keep a list of the medicines, vitamins, and herbs you take  Include the amounts, and when and why you take them  Bring the list or the pill bottles to follow-up visits  Carry your medicine list with you in case of an emergency  Manage your symptoms:   · Drink liquids as directed  to prevent dehydration  Ask how much liquid to drink each day and which liquids are best for you  Ask if you should drink an oral rehydration solution (ORS)  An ORS has the right amounts of water, salts, and sugar you need to replace body fluids  This may help prevent dehydration caused by vomiting or diarrhea  Do not drink liquids with caffeine  Drinks with caffeine can make dehydration worse  · Get plenty of rest  to help your body heal  Take naps throughout the day  Ask your healthcare provider when you can return to work and your normal activities  · Use a cool mist humidifier  to help you breathe easier if you have nasal or chest congestion  Ask your healthcare provider how to use a cool mist humidifier  · Eat honey or use cough drops  to help decrease throat discomfort  Ask your healthcare provider how much honey you should eat each day  Cough drops are available without a doctor's order  Follow directions for taking cough drops  · Do not smoke and stay away from others who smoke  Nicotine and other chemicals in cigarettes and cigars can cause lung damage  Smoking can also delay healing  Ask your healthcare provider for information if you currently smoke and need help to quit  E-cigarettes or smokeless tobacco still contain nicotine  Talk to your healthcare provider before you use these products  · Wash your hands frequently  to prevent the spread of germs to others  Use soap and water  Use gel hand  when soap and water are not available  Wash your hands after you use the bathroom, cough, or sneeze  Wash your hands before you prepare or eat food    Follow up with your healthcare provider as directed:  Write down your questions so you remember to ask them during your visits  © 2017 [employer] Information is for End User's use only and may not be sold, redistributed or otherwise used for commercial purposes  All illustrations and images included in CareNotes® are the copyrighted property of [employer]  or [employer]i [employer]  The above information is an  only  It is not intended as medical advice for individual conditions or treatments  Talk to your doctor, nurse or pharmacist before following any medical regimen to see if it is safe and effective for you

## 2020-11-04 ENCOUNTER — TELEMEDICINE (OUTPATIENT)
Dept: INTERNAL MEDICINE CLINIC | Facility: CLINIC | Age: 60
End: 2020-11-04
Payer: COMMERCIAL

## 2020-11-04 DIAGNOSIS — R53.83 OTHER FATIGUE: ICD-10-CM

## 2020-11-04 DIAGNOSIS — R53.83 OTHER FATIGUE: Primary | ICD-10-CM

## 2020-11-04 PROCEDURE — U0003 INFECTIOUS AGENT DETECTION BY NUCLEIC ACID (DNA OR RNA); SEVERE ACUTE RESPIRATORY SYNDROME CORONAVIRUS 2 (SARS-COV-2) (CORONAVIRUS DISEASE [COVID-19]), AMPLIFIED PROBE TECHNIQUE, MAKING USE OF HIGH THROUGHPUT TECHNOLOGIES AS DESCRIBED BY CMS-2020-01-R: HCPCS | Performed by: INTERNAL MEDICINE

## 2020-11-04 PROCEDURE — 99213 OFFICE O/P EST LOW 20 MIN: CPT | Performed by: INTERNAL MEDICINE

## 2020-11-06 LAB — SARS-COV-2 RNA SPEC QL NAA+PROBE: NOT DETECTED

## 2021-04-26 ENCOUNTER — OFFICE VISIT (OUTPATIENT)
Dept: FAMILY MEDICINE CLINIC | Facility: CLINIC | Age: 61
End: 2021-04-26
Payer: COMMERCIAL

## 2021-04-26 VITALS
DIASTOLIC BLOOD PRESSURE: 90 MMHG | WEIGHT: 223.8 LBS | HEIGHT: 74 IN | HEART RATE: 62 BPM | BODY MASS INDEX: 28.72 KG/M2 | TEMPERATURE: 97.6 F | RESPIRATION RATE: 16 BRPM | SYSTOLIC BLOOD PRESSURE: 142 MMHG | OXYGEN SATURATION: 99 %

## 2021-04-26 DIAGNOSIS — M54.41 ACUTE RIGHT-SIDED LOW BACK PAIN WITH RIGHT-SIDED SCIATICA: Primary | ICD-10-CM

## 2021-04-26 PROCEDURE — 99213 OFFICE O/P EST LOW 20 MIN: CPT | Performed by: FAMILY MEDICINE

## 2021-04-26 RX ORDER — COVID-19 ANTIGEN TEST
KIT MISCELLANEOUS
COMMUNITY

## 2021-04-26 RX ORDER — METHYLPREDNISOLONE 4 MG/1
TABLET ORAL
Qty: 21 EACH | Refills: 0 | Status: SHIPPED | OUTPATIENT
Start: 2021-04-26 | End: 2021-10-29 | Stop reason: ALTCHOICE

## 2021-04-26 RX ORDER — METHOCARBAMOL 500 MG/1
500 TABLET, FILM COATED ORAL 3 TIMES DAILY PRN
Qty: 30 TABLET | Refills: 0 | Status: SHIPPED | OUTPATIENT
Start: 2021-04-26 | End: 2021-10-29 | Stop reason: ALTCHOICE

## 2021-04-26 NOTE — PROGRESS NOTES
FAMILY PRACTICE OFFICE VISIT       NAME: Jessica Guerrero  AGE: 63 y o  SEX: male       : 1960        MRN: [id number]    DATE: 2021  TIME: 9:01 AM    Assessment and Plan     Problem List Items Addressed This Visit        Nervous and Auditory    Acute right-sided low back pain with right-sided sciatica - Primary       Back pain  Patient with musculoskeletal etiology to symptoms however he does exhibit some sciatic irritation  He was given prescription for Medrol Dosepak take as directed for 6 days  He will also use Robaxin 500 mg t i d  p r n  He will continue with heat to the affected area 2-3 times daily for 15 minutes  Patient was also given the option for physical therapy consultation for which patient would prefer to wait at this time  Relevant Medications    methylPREDNISolone 4 MG tablet therapy pack    methocarbamol (ROBAXIN) 500 mg tablet          BMI Counseling: Body mass index is 28 73 kg/m²  The BMI is above normal  Nutrition recommendations include decreasing portion sizes and moderation in carbohydrate intake  Exercise recommendations include exercising 3-5 times per week  Chief Complaint     Chief Complaint   Patient presents with    Pain       History of Present Illness      Patient the office with complaint of low back pain radiating to his right leg  Patient has a history of occasional exacerbation of low back pain  He had been participating in a cross fit training program at the gym when fell onset of discomfort of his low back  He subsequently was able to play golf on vacation without much discomfort  When he returned from vacation he again went to [employer] and felt worsening pain now radiating to his right lower extremity to his tibial area  Symptoms are worse after prolonged periods of sitting  Review of Systems   Review of Systems   Constitutional: Negative  Respiratory: Negative  Cardiovascular: Negative      Gastrointestinal: Negative  Genitourinary: Negative  Musculoskeletal: Positive for arthralgias, back pain and gait problem  Neurological:        As per HPI       Active Problem List     Patient Active Problem List   Diagnosis    Attention deficit disorder    Gastroesophageal reflux disease without esophagitis    Acute cholecystitis    Cholecystitis    Primary osteoarthritis of right knee    Physical exam    Cellulitis    Acute right-sided low back pain with right-sided sciatica       Past Medical History:  Past Medical History:   Diagnosis Date    Cholelithiasis        Past Surgical History:  Past Surgical History:   Procedure Laterality Date    APPENDECTOMY      CHOLANGIOGRAM N/A 1/31/2018    Procedure: CHOLANGIOGRAM;  Surgeon: Yasir Skelton MD;  [facility];  Service: General    CHOLECYSTECTOMY      CHOLECYSTECTOMY LAPAROSCOPIC N/A 1/31/2018    Procedure: CHOLECYSTECTOMY LAPAROSCOPIC;  Surgeon: Yasir Skelton MD;  [facility];  Service: General    KNEE ARTHROSCOPY      MS COLONOSCOPY FLX DX W/COLLJ SPEC WHEN PFRMD N/A 7/19/2017    Procedure: COLONOSCOPY;  Surgeon: Swtea Dennis MD;  [facility];   Service: Colorectal    ROTATOR CUFF REPAIR         Family History:  Family History   Problem Relation Age of Onset   Monica Molina Cancer Mother     Heart disease Father        Social History:  Social History     Socioeconomic History    Marital status: /Civil Union     Spouse name: Not on file    Number of children: Not on file    Years of education: Not on file    Highest education level: Not on file   Occupational History    Not on file   Social Needs    Financial resource strain: Not on file    Food insecurity     Worry: Not on file     Inability: Not on file   [employer] needs     Medical: Not on file     Non-medical: Not on file   Tobacco Use    Smoking status: Never Smoker    Smokeless tobacco: Never Used   Substance and Sexual Activity    Alcohol use: Yes     Comment: socially    Drug use: No    Sexual activity: Not on file   Lifestyle    Physical activity     Days per week: Not on file     Minutes per session: Not on file    Stress: Not on file   Relationships    Social connections     Talks on phone: Not on file     Gets together: Not on file     Attends Voodoo service: Not on file     Active member of club or organization: Not on file     Attends meetings of clubs or organizations: Not on file     Relationship status: Not on file    Intimate partner violence     Fear of current or ex partner: Not on file     Emotionally abused: Not on file     Physically abused: Not on file     Forced sexual activity: Not on file   Other Topics Concern    Not on file   Social History Narrative    Not on file       Objective     Vitals:    04/26/21 1544   BP: 142/90   Pulse: 62   Resp: 16   Temp: 97 6 °F (36 4 °C)   SpO2: 99%     Wt Readings from Last 3 Encounters:   04/26/21 102 kg (223 lb 12 8 oz)   03/17/20 97 5 kg (215 lb)   12/02/19 99 9 kg (220 lb 3 2 oz)       Physical Exam  Constitutional:       General: He is not in acute distress  Appearance: Normal appearance  He is not ill-appearing  Musculoskeletal:      Right lower leg: No edema  Left lower leg: No edema  Comments: Patient with positive straight leg raising on left side  Muscle strength 5/5 lower extremities  Normal range of motion of hips although increased back pain with internal rotation  Negative point tenderness to palpation of hip   Neurological:      General: No focal deficit present  Mental Status: He is alert and oriented to person, place, and time  Mental status is at baseline  Cranial Nerves: No cranial nerve deficit  Psychiatric:         Mood and Affect: Mood normal          Behavior: Behavior normal          Thought Content:  Thought content normal          Judgment: Judgment normal          Pertinent Laboratory/Diagnostic Studies:  Lab Results   Component Value Date    BUN 14 09/19/2019    CREATININE 1 37 (H) 09/19/2019    CALCIUM 9 2 09/19/2019     12/02/2016    K 4 5 09/19/2019    CO2 28 09/19/2019     09/19/2019     Lab Results   Component Value Date    ALT 35 09/19/2019    AST 34 09/19/2019    ALKPHOS 62 09/19/2019    BILITOT 1 3 (H) 12/02/2016       Lab Results   Component Value Date    WBC 4 72 12/03/2019    HGB 15 7 12/03/2019    HCT 47 6 12/03/2019    MCV 92 12/03/2019     12/03/2019       No results found for: TSH    Lab Results   Component Value Date    CHOL 204 (H) 12/02/2016     Lab Results   Component Value Date    TRIG 53 09/19/2019     Lab Results   Component Value Date    HDL 70 (H) 09/19/2019     Lab Results   Component Value Date    LDLCALC 108 (H) 09/19/2019     No results found for: HGBA1C    Results for orders placed or performed in visit on 11/04/20   Novel Coronavirus (COVID-19), PCR [facility]    Specimen: Nasopharyngeal Swab   Result Value Ref Range    SARS-CoV-2  Not Detected Not Detected       No orders of the defined types were placed in this encounter  ALLERGIES:  Allergies   Allergen Reactions    Other Rash     Benzoin       Current Medications     Current Outpatient Medications   Medication Sig Dispense Refill    Naproxen Sodium (Aleve) 220 MG CAPS Take by mouth      methocarbamol (ROBAXIN) 500 mg tablet Take 1 tablet (500 mg total) by mouth 3 (three) times a day as needed for muscle spasms 30 tablet 0    methylPREDNISolone 4 MG tablet therapy pack Use as directed on package 21 each 0     No current facility-administered medications for this visit            Health Maintenance     Health Maintenance   Topic Date Due    Hepatitis C Screening  Never done    HIV Screening  Never done    COVID-19 Vaccine (1) Never done    DTaP,Tdap,and Td Vaccines (1 - Tdap) Never done    PT PLAN OF CARE  10/17/2018    Annual Physical  09/16/2020    Depression Screening PHQ  04/26/2022  BMI: Followup Plan  04/26/2022    BMI: Adult  04/26/2022    Colonoscopy Surveillance  07/19/2022    Colorectal Cancer Screening  07/19/2027    Influenza Vaccine  Completed    Pneumococcal Vaccine: Pediatrics (0 to 5 Years) and At-Risk Patients (6 to 59 Years)  Aged Out    HIB Vaccine  Aged Out    Hepatitis B Vaccine  Aged Out    IPV Vaccine  Aged Out    Hepatitis A Vaccine  Aged Out    Meningococcal ACWY Vaccine  Aged Out    HPV Vaccine  Aged [employer] History   Administered Date(s) Administered    INFLUENZA 10/01/2020    Influenza, injectable, quadrivalent, preservative free 0 5 mL 09/16/2019    Influenza, recombinant, quadrivalent,injectable, preservative free 09/13/2018    Influenza, seasonal, injectable 86/18/9934       Carline Rondon MD

## 2021-04-27 NOTE — ASSESSMENT & PLAN NOTE
Back pain  Patient with musculoskeletal etiology to symptoms however he does exhibit some sciatic irritation  He was given prescription for Medrol Dosepak take as directed for 6 days  He will also use Robaxin 500 mg t i d  p r n  He will continue with heat to the affected area 2-3 times daily for 15 minutes  Patient was also given the option for physical therapy consultation for which patient would prefer to wait at this time

## 2021-09-05 ENCOUNTER — OFFICE VISIT (OUTPATIENT)
Dept: URGENT CARE | Facility: CLINIC | Age: 61
End: 2021-09-05
Payer: COMMERCIAL

## 2021-09-05 VITALS — HEART RATE: 62 BPM | OXYGEN SATURATION: 99 % | RESPIRATION RATE: 18 BRPM | TEMPERATURE: 98.6 F

## 2021-09-05 DIAGNOSIS — J01.90 ACUTE SINUSITIS, RECURRENCE NOT SPECIFIED, UNSPECIFIED LOCATION: Primary | ICD-10-CM

## 2021-09-05 DIAGNOSIS — J40 BRONCHITIS: ICD-10-CM

## 2021-09-05 DIAGNOSIS — Z20.822 ENCOUNTER FOR LABORATORY TESTING FOR COVID-19 VIRUS: ICD-10-CM

## 2021-09-05 PROCEDURE — U0003 INFECTIOUS AGENT DETECTION BY NUCLEIC ACID (DNA OR RNA); SEVERE ACUTE RESPIRATORY SYNDROME CORONAVIRUS 2 (SARS-COV-2) (CORONAVIRUS DISEASE [COVID-19]), AMPLIFIED PROBE TECHNIQUE, MAKING USE OF HIGH THROUGHPUT TECHNOLOGIES AS DESCRIBED BY CMS-2020-01-R: HCPCS

## 2021-09-05 PROCEDURE — U0005 INFEC AGEN DETEC AMPLI PROBE: HCPCS

## 2021-09-05 PROCEDURE — S9083 URGENT CARE CENTER GLOBAL: HCPCS | Performed by: PHYSICIAN ASSISTANT

## 2021-09-05 PROCEDURE — G0382 LEV 3 HOSP TYPE B ED VISIT: HCPCS | Performed by: PHYSICIAN ASSISTANT

## 2021-09-05 RX ORDER — AZITHROMYCIN 250 MG/1
TABLET, FILM COATED ORAL
Qty: 6 TABLET | Refills: 0 | Status: SHIPPED | OUTPATIENT
Start: 2021-09-05 | End: 2021-09-09

## 2021-09-05 RX ORDER — BENZONATATE 100 MG/1
100 CAPSULE ORAL 3 TIMES DAILY PRN
Qty: 20 CAPSULE | Refills: 0 | Status: SHIPPED | OUTPATIENT
Start: 2021-09-05 | End: 2021-10-29 | Stop reason: ALTCHOICE

## 2021-09-05 NOTE — PROGRESS NOTES
day as needed for cough, Disp: 20 capsule, Rfl: 0    methocarbamol (ROBAXIN) 500 mg tablet, Take 1 tablet (500 mg total) by mouth 3 (three) times a day as needed for muscle spasms, Disp: 30 tablet, Rfl: 0    methylPREDNISolone 4 MG tablet therapy pack, Use as directed on package, Disp: 21 each, Rfl: 0    Naproxen Sodium (Aleve) 220 MG CAPS, Take by mouth, Disp: , Rfl:     Current Allergies     Allergies as of 09/05/2021 - Reviewed 09/05/2021   Allergen Reaction Noted    Other Rash 01/31/2018            The following portions of the patient's history were reviewed and updated as appropriate: allergies, current medications, past family history, past medical history, past social history, past surgical history and problem list      Past Medical History:   Diagnosis Date    Cholelithiasis        Past Surgical History:   Procedure Laterality Date    APPENDECTOMY      CHOLANGIOGRAM N/A 1/31/2018    Procedure: CHOLANGIOGRAM;  Surgeon: Parth Perez MD;  [facility];  Service: General    CHOLECYSTECTOMY      CHOLECYSTECTOMY LAPAROSCOPIC N/A 1/31/2018    Procedure: Alexi Fletcher;  Surgeon: Parth Perez MD;  [facility];  Service: General    KNEE ARTHROSCOPY      AR COLONOSCOPY FLX DX W/COLLJ SPEC WHEN PFRMD N/A 7/19/2017    Procedure: COLONOSCOPY;  Surgeon: Joie Lobo MD;  [facility]; Service: Colorectal    ROTATOR CUFF REPAIR         Family History   Problem Relation Age of Onset    Cancer Mother     Heart disease Father          Medications have been verified  Objective   Pulse 62   Temp 98 6 °F (37 °C)   Resp 18   SpO2 99%        Physical Exam     Physical Exam  Vitals and nursing note reviewed  Constitutional:       Appearance: Normal appearance  He is normal weight  HENT:      Head: Normocephalic and atraumatic        Right Ear: Tympanic membrane, ear canal and external ear normal       Left Ear: Tympanic membrane, ear canal and external ear normal       Nose: Congestion and rhinorrhea present  Mouth/Throat:      Mouth: Mucous membranes are moist       Pharynx: Oropharynx is clear  Eyes:      Extraocular Movements: Extraocular movements intact  Conjunctiva/sclera: Conjunctivae normal       Pupils: Pupils are equal, round, and reactive to light  Cardiovascular:      Rate and Rhythm: Normal rate and regular rhythm  Pulses: Normal pulses  Heart sounds: Normal heart sounds  Pulmonary:      Effort: Pulmonary effort is normal       Comments: Minor coarse sounds   Abdominal:      General: Abdomen is flat  Bowel sounds are normal       Palpations: Abdomen is soft  Musculoskeletal:         General: Normal range of motion  Cervical back: Normal range of motion and neck supple  Skin:     General: Skin is warm  Capillary Refill: Capillary refill takes less than 2 seconds  Neurological:      General: No focal deficit present  Mental Status: He is alert and oriented to person, place, and time     Psychiatric:         Mood and Affect: Mood normal          Behavior: Behavior normal

## 2021-09-05 NOTE — PATIENT INSTRUCTIONS
[address]    Your healthcare provider and/or public health staff have evaluated you and have determined that you do not need to remain in the hospital at this time  At this time you can be isolated at home where you will be monitored by staff from your local or state health department  You should carefully follow the prevention and isolation steps below until a healthcare provider or local or state health department says that you can return to your normal activities  Stay home except to get medical care    People who are mildly ill with COVID-19 are able to isolate at home during their illness  You should restrict activities outside your home, except for getting medical care  Do not go to work, school, or public areas  Avoid using public transportation, ride-sharing, or taxis  Separate yourself from other people and animals in your home    People: As much as possible, you should stay in a specific room and away from other people in your home  Also, you should use a separate bathroom, if available  Animals: You should restrict contact with pets and other animals while you are sick with COVID-19, just like you would around other people  Although there have not been reports of pets or other animals becoming sick with COVID-19, it is still recommended that people sick with COVID-19 limit contact with animals until more information is known about the virus  When possible, have another member of your household care for your animals while you are sick  If you are sick with COVID-19, avoid contact with your pet, including petting, snuggling, being kissed or licked, and sharing food  If you must care for your pet or be around animals while you are sick, wash your hands before and after you interact with pets and wear a facemask  See COVID-19 and Animals for more information      Call ahead before visiting your doctor    If you have a medical appointment, call the healthcare provider and tell them that you have or may have COVID-19  This will help the healthcare providers office take steps to keep other people from getting infected or exposed  Wear a facemask    You should wear a facemask when you are around other people (e g , sharing a room or vehicle) or pets and before you enter a healthcare providers office  If you are not able to wear a facemask (for example, because it causes trouble breathing), then people who live with you should not stay in the same room with you, or they should wear a facemask if they enter your room  Cover your coughs and sneezes    Cover your mouth and nose with a tissue when you cough or sneeze  Throw used tissues in a lined trash can  Immediately wash your hands with soap and water for at least 20 seconds or, if soap and water are not available, clean your hands with an alcohol-based hand  that contains at least 60% alcohol  Clean your hands often    Wash your hands often with soap and water for at least 20 seconds, especially after blowing your nose, coughing, or sneezing; going to the bathroom; and before eating or preparing food  If soap and water are not readily available, use an alcohol-based hand  with at least 60% alcohol, covering all surfaces of your hands and rubbing them together until they feel dry  Soap and water are the best option if hands are visibly dirty  Avoid touching your eyes, nose, and mouth with unwashed hands  Avoid sharing personal household items    You should not share dishes, drinking glasses, cups, eating utensils, towels, or bedding with other people or pets in your home  After using these items, they should be washed thoroughly with soap and water  Clean all high-touch surfaces everyday    High touch surfaces include counters, tabletops, doorknobs, bathroom fixtures, toilets, phones, keyboards, tablets, and bedside tables  Also, clean any surfaces that may have blood, stool, or body fluids on them   Use a household cleaning spray or wipe, according to the label instructions  Labels contain instructions for safe and effective use of the cleaning product including precautions you should take when applying the product, such as wearing gloves and making sure you have good ventilation during use of the product  Monitor your symptoms    Seek prompt medical attention if your illness is worsening (e g , difficulty breathing)  Before seeking care, call your healthcare provider and tell them that you have, or are being evaluated for, COVID-19  Put on a facemask before you enter the facility  These steps will help the healthcare providers office to keep other people in the office or waiting room from getting infected or exposed  Ask your healthcare provider to call the local or [facility] health department  Persons who are placed under active monitoring or facilitated self-monitoring should follow instructions provided by their local health department or occupational health professionals, as appropriate  If you have a medical emergency and need to call 911, notify the dispatch personnel that you have, or are being evaluated for COVID-19  If possible, put on a facemask before emergency medical services arrive      Discontinuing home isolation    Patients with confirmed COVID-19 should remain under home isolation precautions until the following conditions are met:   - They have had no fever for at least 24 hours (that is one full day of no fever without the use medicine that reduces fevers)  AND  - other symptoms have improved (for example, when their cough or shortness of breath have improved)  AND  - If had mild or moderate illness, at least 10 days have passed since their symptoms first appeared or if severe illness (needed oxygen) or immunosuppressed, at least 20 days have passed since symptoms first appeared  Patients with confirmed COVID-19 should also notify close contacts (including their workplace) and ask that they self-quarantine  Currently, close contact is defined as being within 6 feet for 15 minutes or more from the period 24 hours starting 48 hours before symptom onset to the time at which the patient went into isolation  Close contacts of patients diagnosed with COVID-19 should be instructed by the patient to self-quarantine for 14 days from the last time of their last contact with the patient  Source: [employer]  Acute Bronchitis   WHAT YOU NEED TO KNOW:   Acute bronchitis is swelling and irritation in your lungs  It is usually caused by a virus and most often happens in the winter  Bronchitis may also be caused by bacteria or by a chemical irritant, such as smoke  DISCHARGE INSTRUCTIONS:   Return to the emergency department if:   · You cough up blood  · Your lips or fingernails turn blue  · You feel like you are not getting enough air when you breathe  Call your doctor if:   · Your symptoms do not go away or get worse, even after treatment  · Your cough does not get better within 4 weeks  · You have questions or concerns about your condition or care  Medicines: You may  need any of the following:  · Cough suppressants  decrease your urge to cough  · Decongestants  help loosen mucus in your lungs and make it easier to cough up  This can help you breathe easier  · Inhalers  may be given  Your healthcare provider may give you one or more inhalers to help you breathe easier and cough less  An inhaler gives your medicine to open your airways  Ask your healthcare provider to show you how to use your inhaler correctly  · Acetaminophen  decreases pain and fever  It is available without a doctor's order  Ask how much to take and how often to take it  Follow directions   Read the labels of all other medicines you are using to see if they also contain acetaminophen, or ask your doctor or pharmacist  Acetaminophen can cause liver damage if not taken correctly  Do not use more than 4 grams (4,000 milligrams) total of acetaminophen in one day  · NSAIDs  help decrease swelling and pain or fever  This medicine is available with or without a doctor's order  NSAIDs can cause stomach bleeding or kidney problems in certain people  If you take blood thinner medicine, always ask your healthcare provider if NSAIDs are safe for you  Always read the medicine label and follow directions  · Take your medicine as directed  Contact your healthcare provider if you think your medicine is not helping or if you have side effects  Tell him of her if you are allergic to any medicine  Keep a list of the medicines, vitamins, and herbs you take  Include the amounts, and when and why you take them  Bring the list or the pill bottles to follow-up visits  Carry your medicine list with you in case of an emergency  Self-care:   · Drink liquids as directed  You may need to drink more liquids than usual to stay hydrated  Ask how much liquid to drink each day and which liquids are best for you  · Use a cool mist humidifier  to increase air moisture in your home  This may make it easier for you to breathe and help decrease your cough  · Get more rest   Rest helps your body to heal  Slowly start to do more each day  Rest when you feel it is needed  · Avoid irritants in the air  Avoid chemicals, fumes, and dust  Wear a face mask if you must work around dust or fumes  Stay inside on days when air pollution levels are high  If you have allergies, stay inside when pollen counts are high  Do not use aerosol products, such as spray-on deodorant, bug spray, and hair spray  · Do not smoke or be around others who are smoking  Nicotine and other chemicals in cigarettes and cigars can cause lung damage  Ask your healthcare provider for information if you currently smoke and need help to quit  E-cigarettes or smokeless tobacco still contain nicotine   Talk to your healthcare provider before you use these products  Prevent acute bronchitis:       · Get the vaccinations you need  Ask your healthcare provider if you should get the flu or pneumonia vaccines  · Prevent the spread of germs  You can decrease your risk for acute bronchitis and other illnesses by doing the following:     ? Wash your hands often with soap and water  Carry germ-killing hand lotion or gel with you  You can use the lotion or gel to clean your hands when soap and water are not available  ? Do not touch your eyes, nose, or mouth unless you have washed your hands first     ? Always cover your mouth when you cough to prevent the spread of germs  It is best to cough into a tissue or your shirt sleeve instead of into your hand  Ask those around you to cover their mouths when they cough  ? Try to avoid people who have a cold or the flu  If you are sick, stay away from others as much as possible  Follow up with your doctor as directed:  Write down questions you have so you will remember to ask them during your follow-up visits  © Copyright [employer] 2021 Information is for End User's use only and may not be sold, redistributed or otherwise used for commercial purposes  All illustrations and images included in CareNotes® are the copyrighted property of A [employer] A M , Inc  or [address] [place], [place] [address]pa[facility] [place]  The above information is an  only  It is not intended as medical advice for individual conditions or treatments  Talk to your doctor, nurse or pharmacist before following any medical regimen to see if it is safe and effective for you  Sinusitis   WHAT YOU NEED TO KNOW:   Sinusitis is inflammation or infection of your sinuses  Sinusitis is most often caused by a virus  Acute sinusitis may last up to 12 weeks  Chronic sinusitis lasts longer than 12 weeks  Recurrent sinusitis means you have 4 or more infections in 1 year        DISCHARGE INSTRUCTIONS:   Return to the emergency department if:   · You have trouble breathing or wheezing that is getting worse  · You have a stiff neck, a fever, or a bad headache  · You cannot open your eye  · Your eyeball bulges out or you cannot move your eye  · You are more sleepy than normal, or you notice changes in your ability to think, move, or talk  · You have swelling of your forehead or scalp  Call your doctor if:   · You have vision changes, such as double vision  · Your eye and eyelid are red, swollen, and painful  · Your symptoms do not improve or go away after 10 days  · You have nausea and are vomiting  · Your nose is bleeding  · You have questions or concerns about your condition or care  Medicines: Your symptoms may go away on their own  Your healthcare provider may recommend watchful waiting for up to 10 days before starting antibiotics  You may need any of the following:  · Acetaminophen  decreases pain and fever  It is available without a doctor's order  Ask how much to take and how often to take it  Follow directions  Read the labels of all other medicines you are using to see if they also contain acetaminophen, or ask your doctor or pharmacist  Acetaminophen can cause liver damage if not taken correctly  Do not use more than 4 grams (4,000 milligrams) total of acetaminophen in one day  · NSAIDs , such as ibuprofen, help decrease swelling, pain, and fever  This medicine is available with or without a doctor's order  NSAIDs can cause stomach bleeding or kidney problems in certain people  If you take blood thinner medicine, always ask your healthcare provider if NSAIDs are safe for you  Always read the medicine label and follow directions  · Nasal steroid sprays  may help decrease inflammation in your nose and sinuses  · Decongestants  help reduce swelling and drain mucus in the nose and sinuses  They may help you breathe easier       · Antihistamines  help dry mucus in the nose and relieve sneezing  · Antibiotics  help treat or prevent a bacterial infection  · Take your medicine as directed  Contact your healthcare provider if you think your medicine is not helping or if you have side effects  Tell him or her if you are allergic to any medicine  Keep a list of the medicines, vitamins, and herbs you take  Include the amounts, and when and why you take them  Bring the list or the pill bottles to follow-up visits  Carry your medicine list with you in case of an emergency  Self-care:   · Rinse your sinuses as directed  Use a sinus rinse device to rinse your nasal passages with a saline (salt water) solution or distilled water  Do not use tap water  This will help thin the mucus in your nose and rinse away pollen and dirt  It will also help reduce swelling so you can breathe normally  · Use a humidifier  to increase air moisture in your home  This may make it easier for you to breathe and help decrease your cough  · Sleep with your head elevated  Place an extra pillow under your head before you go to sleep to help your sinuses drain  · Drink liquids as directed  Ask your healthcare provider how much liquid to drink each day and which liquids are best for you  Liquids will thin the mucus in your nose and help it drain  Avoid drinks that contain alcohol or caffeine  · Do not smoke, and avoid secondhand smoke  Nicotine and other chemicals in cigarettes and cigars can make your symptoms worse  Ask your healthcare provider for information if you currently smoke and need help to quit  E-cigarettes or smokeless tobacco still contain nicotine  Talk to your healthcare provider before you use these products  Prevent the spread of germs:   · Wash your hands often with soap and water  Wash your hands after you use the bathroom, change a child's diaper, or sneeze  Wash your hands before you prepare or eat food  · Stay away from people who are sick    Some germs spread easily and quickly through contact  Follow up with your doctor as directed: You may be referred to an ear, nose, and throat specialist  Write down your questions so you remember to ask them during your visits  © Copyright [address] 2021 Information is for End User's use only and may not be sold, redistributed or otherwise used for commercial purposes  All illustrations and images included in CareNotes® are the copyrighted property of A [employer] A M , Inc  or [facility][note 3] Margaret Malcolm  The above information is an  only  It is not intended as medical advice for individual conditions or treatments  Talk to your doctor, nurse or pharmacist before following any medical regimen to see if it is safe and effective for you

## 2021-09-07 LAB — SARS-COV-2 RNA RESP QL NAA+PROBE: NEGATIVE

## 2021-10-29 ENCOUNTER — TELEMEDICINE (OUTPATIENT)
Dept: FAMILY MEDICINE CLINIC | Facility: CLINIC | Age: 61
End: 2021-10-29
Payer: COMMERCIAL

## 2021-10-29 VITALS
WEIGHT: 210 LBS | TEMPERATURE: 99 F | BODY MASS INDEX: 26.95 KG/M2 | OXYGEN SATURATION: 95 % | HEART RATE: 73 BPM | HEIGHT: 74 IN

## 2021-10-29 DIAGNOSIS — U07.1 COVID-19: Primary | ICD-10-CM

## 2021-10-29 PROCEDURE — 99213 OFFICE O/P EST LOW 20 MIN: CPT | Performed by: NURSE PRACTITIONER

## 2022-01-13 ENCOUNTER — OFFICE VISIT (OUTPATIENT)
Dept: FAMILY MEDICINE CLINIC | Facility: CLINIC | Age: 62
End: 2022-01-13
Payer: COMMERCIAL

## 2022-01-13 VITALS
TEMPERATURE: 97.8 F | HEART RATE: 68 BPM | WEIGHT: 221.38 LBS | OXYGEN SATURATION: 98 % | SYSTOLIC BLOOD PRESSURE: 124 MMHG | DIASTOLIC BLOOD PRESSURE: 80 MMHG | BODY MASS INDEX: 28.41 KG/M2 | HEIGHT: 74 IN | RESPIRATION RATE: 18 BRPM

## 2022-01-13 DIAGNOSIS — B35.9 TINEA: ICD-10-CM

## 2022-01-13 DIAGNOSIS — R35.1 NOCTURIA: ICD-10-CM

## 2022-01-13 DIAGNOSIS — R79.89 ELEVATED SERUM CREATININE: Primary | ICD-10-CM

## 2022-01-13 PROCEDURE — 1036F TOBACCO NON-USER: CPT | Performed by: FAMILY MEDICINE

## 2022-01-13 PROCEDURE — 3008F BODY MASS INDEX DOCD: CPT | Performed by: FAMILY MEDICINE

## 2022-01-13 PROCEDURE — 99213 OFFICE O/P EST LOW 20 MIN: CPT | Performed by: FAMILY MEDICINE

## 2022-01-13 RX ORDER — CLOTRIMAZOLE AND BETAMETHASONE DIPROPIONATE 10; .64 MG/G; MG/G
CREAM TOPICAL 2 TIMES DAILY
Qty: 45 G | Refills: 3 | Status: SHIPPED | OUTPATIENT
Start: 2022-01-13

## 2022-01-13 NOTE — PROGRESS NOTES
FAMILY PRACTICE OFFICE VISIT       NAME: Maxime Howard  AGE: 57 y o  SEX: male       : 1960        MRN: [id number]    DATE: 2022  TIME: 4:12 PM    Assessment and Plan     Problem List Items Addressed This Visit        Musculoskeletal and Integument    Tinea     Tinea  Patient given prescription for Lotrisone cream to apply twice daily to the affected area  Patient will call if symptoms persist without change over the next 2 weeks         Relevant Medications    clotrimazole-betamethasone (LOTRISONE) 1-0 05 % cream       Other    Elevated serum creatinine - Primary     Elevated serum creatinine  Patient was advised to avoid NSAIDs over-the-counter as well as discontinuing his creatinine  Patient will continue with proper hydration and repeat BMP over the next 4 weeks  We will make further recommendations pending results of test          Relevant Orders    Basic metabolic panel      Other Visit Diagnoses     Nocturia        Relevant Orders    PSA, Total Screen              Chief Complaint     Chief Complaint   Patient presents with    Rash     groin area , red,  itchy  x getting worse        History of Present Illness     Patient in the office with complaint of chronic intermittent bilateral inguinal rash that has been present since 2021  His tried over-the-counter hydrocortisone cream which made symptoms worse  Patient does have extensive pruritus at times  He denies any dysuria  Rash is not present on other parts of the body    I reviewed the patient's wellness blood work performed by his employer from 2021  Patient had slight elevation in creatinine at 1 42  He admits to being on creatinine tablets once daily for the past year as well as taking a leave somewhat regular basis when he has arthralgias from exercise or playing golf    Patient does make it a point to stay well hydrated throughout the day      Review of Systems   Review of Systems   Constitutional: Negative  Respiratory: Negative  Cardiovascular: Negative  Gastrointestinal: Negative  Musculoskeletal: Positive for arthralgias  Skin:        As per HPI       Active Problem List     Patient Active Problem List   Diagnosis    Attention deficit disorder    Gastroesophageal reflux disease without esophagitis    Acute cholecystitis    Cholecystitis    Primary osteoarthritis of right knee    Physical exam    Cellulitis    Acute right-sided low back pain with right-sided sciatica    Elevated serum creatinine    Tinea       Past Medical History:  Past Medical History:   Diagnosis Date    Cholelithiasis        Past Surgical History:  Past Surgical History:   Procedure Laterality Date    APPENDECTOMY      CHOLANGIOGRAM N/A 1/31/2018    Procedure: CHOLANGIOGRAM;  Surgeon: Debborah Lundborg, MD;  [facility];  Service: General    CHOLECYSTECTOMY      CHOLECYSTECTOMY LAPAROSCOPIC N/A 1/31/2018    Procedure: CHOLECYSTECTOMY LAPAROSCOPIC;  Surgeon: Debborah Lundborg, MD;  [facility];  Service: General    KNEE ARTHROSCOPY      KS COLONOSCOPY FLX DX W/COLLJ SPEC WHEN PFRMD N/A 7/19/2017    Procedure: COLONOSCOPY;  Surgeon: Diamante Woodruff MD;  [facility];   Service: Colorectal    ROTATOR CUFF REPAIR         Family History:  Family History   Problem Relation Age of Onset   [facility] Cancer Mother     Heart disease Father        Social History:  Social History     Socioeconomic History    Marital status: /Civil Union     Spouse name: Not on file    Number of children: Not on file    Years of education: Not on file    Highest education level: Not on file   Occupational History    Not on file   Tobacco Use    Smoking status: Never Smoker    Smokeless tobacco: Never Used   Vaping Use    Vaping Use: Never used   Substance and Sexual Activity    Alcohol use: Yes     Comment: socially    Drug use: No    Sexual activity: Yes   Other Topics Concern    Not on file Social History Narrative    Not on file     Social Determinants of Health     Financial Resource Strain: Not on file   Food Insecurity: Not on file   Transportation Needs: Not on file   Physical Activity: Not on file   Stress: Not on file   Social Connections: Not on file   Intimate Partner Violence: Not on file   Housing Stability: Not on file       Objective     Vitals:    01/13/22 1540   BP: 124/80   Pulse: 68   Resp: 18   Temp: 97 8 °F (36 6 °C)   SpO2: 98%     Wt Readings from Last 3 Encounters:   01/13/22 100 kg (221 lb 6 oz)   10/29/21 95 3 kg (210 lb)   04/26/21 102 kg (223 lb 12 8 oz)       Physical Exam  Constitutional:       General: He is not in acute distress  Appearance: Normal appearance  He is not ill-appearing  Skin:     Comments: Patient with faint pink macular skin along bilateral inguinal area  Neurological:      Mental Status: He is alert           Pertinent Laboratory/Diagnostic Studies:  Lab Results   Component Value Date    BUN 14 09/19/2019    CREATININE 1 37 (H) 09/19/2019    CALCIUM 9 2 09/19/2019     12/02/2016    K 4 5 09/19/2019    CO2 28 09/19/2019     09/19/2019     Lab Results   Component Value Date    ALT 35 09/19/2019    AST 34 09/19/2019    ALKPHOS 62 09/19/2019    BILITOT 1 3 (H) 12/02/2016       Lab Results   Component Value Date    WBC 4 72 12/03/2019    HGB 15 7 12/03/2019    HCT 47 6 12/03/2019    MCV 92 12/03/2019     12/03/2019       No results found for: TSH    Lab Results   Component Value Date    CHOL 204 (H) 12/02/2016     Lab Results   Component Value Date    TRIG 53 09/19/2019     Lab Results   Component Value Date    HDL 70 (H) 09/19/2019     Lab Results   Component Value Date    LDLCALC 108 (H) 09/19/2019     No results found for: HGBA1C    Results for orders placed or performed in visit on 10/27/21   Novel Coronavirus (COVID-19), PCR [facility]    Specimen: Nasopharyngeal Swab   Result Value Ref Range    SARS-COV-2 Positive (A) Not Detected, Negative, Inconclusive       Orders Placed This Encounter   Procedures    Basic metabolic panel    PSA, Total Screen       ALLERGIES:  Allergies   Allergen Reactions    Other Rash     Benzoin       Current Medications     Current Outpatient Medications   Medication Sig Dispense Refill    clotrimazole-betamethasone (LOTRISONE) 1-0 05 % cream Apply topically 2 (two) times a day 45 g 3    Naproxen Sodium (Aleve) 220 MG CAPS Take by mouth (Patient not taking: Reported on 1/13/2022 )       No current facility-administered medications for this visit           Health Maintenance     Health Maintenance   Topic Date Due    Hepatitis C Screening  Never done    COVID-19 Vaccine (1) Never done    HIV Screening  Never done    DTaP,Tdap,and Td Vaccines (1 - Tdap) Never done    PT PLAN OF CARE  10/17/2018    Annual Physical  09/16/2020    BMI: Followup Plan  04/26/2022    Colorectal Cancer Screening  07/19/2022    Depression Screening  10/29/2022    BMI: Adult  01/13/2023    Influenza Vaccine  Completed    Pneumococcal Vaccine: Pediatrics (0 to 5 Years) and At-Risk Patients (6 to 59 Years)  Aged Out    HIB Vaccine  Aged Out    Hepatitis B Vaccine  Aged Out    IPV Vaccine  Aged Out    Hepatitis A Vaccine  Aged Out    Meningococcal ACWY Vaccine  Aged Out    HPV Vaccine  Aged [employer] History   Administered Date(s) Administered    INFLUENZA 10/01/2020, 10/19/2021    Influenza, injectable, quadrivalent, preservative free 0 5 mL 09/16/2019    Influenza, recombinant, quadrivalent,injectable, preservative free 09/13/2018    Influenza, seasonal, injectable 17/32/7977       Delfino Shah MD

## 2022-01-13 NOTE — ASSESSMENT & PLAN NOTE
Tinea  Patient given prescription for Lotrisone cream to apply twice daily to the affected area    Patient will call if symptoms persist without change over the next 2 weeks

## 2022-02-08 ENCOUNTER — APPOINTMENT (OUTPATIENT)
Dept: LAB | Facility: CLINIC | Age: 62
End: 2022-02-08
Payer: COMMERCIAL

## 2022-02-08 DIAGNOSIS — R35.1 NOCTURIA: ICD-10-CM

## 2022-02-08 DIAGNOSIS — R79.89 ELEVATED SERUM CREATININE: ICD-10-CM

## 2022-02-08 LAB
ANION GAP SERPL CALCULATED.3IONS-SCNC: 4 MMOL/L (ref 4–13)
BUN SERPL-MCNC: 11 MG/DL (ref 5–25)
CALCIUM SERPL-MCNC: 9.9 MG/DL (ref 8.3–10.1)
CHLORIDE SERPL-SCNC: 101 MMOL/L (ref 100–108)
CO2 SERPL-SCNC: 29 MMOL/L (ref 21–32)
CREAT SERPL-MCNC: 1.21 MG/DL (ref 0.6–1.3)
GFR SERPL CREATININE-BSD FRML MDRD: 63 ML/MIN/1.73SQ M
GLUCOSE P FAST SERPL-MCNC: 92 MG/DL (ref 65–99)
POTASSIUM SERPL-SCNC: 4.4 MMOL/L (ref 3.5–5.3)
PSA SERPL-MCNC: 1.6 NG/ML (ref 0–4)
SODIUM SERPL-SCNC: 134 MMOL/L (ref 136–145)

## 2022-02-08 PROCEDURE — G0103 PSA SCREENING: HCPCS

## 2022-02-08 PROCEDURE — 36415 COLL VENOUS BLD VENIPUNCTURE: CPT

## 2022-02-08 PROCEDURE — 80048 BASIC METABOLIC PNL TOTAL CA: CPT

## 2022-04-19 ENCOUNTER — HOSPITAL ENCOUNTER (OUTPATIENT)
Dept: RADIOLOGY | Facility: HOSPITAL | Age: 62
Discharge: HOME | End: 2022-04-19
Payer: COMMERCIAL

## 2022-04-19 DIAGNOSIS — I25.119 CORONARY ARTERY DISEASE WITH ANGINA PECTORIS, UNSPECIFIED VESSEL OR LESION TYPE, UNSPECIFIED WHETHER NATIVE OR TRANSPLANTED HEART (HCC): ICD-10-CM

## 2022-04-19 PROCEDURE — 75571 CT HRT W/O DYE W/CA TEST: CPT

## 2022-04-19 PROCEDURE — G1004 CDSM NDSC: HCPCS

## 2022-05-05 DIAGNOSIS — I71.2 ASCENDING AORTIC ANEURYSM (HCC): Primary | ICD-10-CM

## 2022-05-27 ENCOUNTER — HOSPITAL ENCOUNTER (OUTPATIENT)
Dept: NON INVASIVE DIAGNOSTICS | Facility: HOSPITAL | Age: 62
Discharge: HOME | End: 2022-05-27
Attending: THORACIC SURGERY (CARDIOTHORACIC VASCULAR SURGERY)
Payer: COMMERCIAL

## 2022-05-27 VITALS
SYSTOLIC BLOOD PRESSURE: 124 MMHG | HEIGHT: 74 IN | WEIGHT: 221 LBS | BODY MASS INDEX: 28.36 KG/M2 | HEART RATE: 68 BPM | DIASTOLIC BLOOD PRESSURE: 80 MMHG

## 2022-05-27 DIAGNOSIS — I71.2 ASCENDING AORTIC ANEURYSM (HCC): ICD-10-CM

## 2022-05-27 LAB
AORTIC ROOT: 3.4 CM
APICAL FOUR CHAMBER EJECTION FRACTION: 56 %
ASCENDING AORTA: 3.5 CM
E WAVE DECELERATION TIME: 151 MS
FRACTIONAL SHORTENING: 34 % (ref 28–44)
INTERVENTRICULAR SEPTUM IN DIASTOLE (PARASTERNAL SHORT AXIS VIEW): 1.1 CM
INTERVENTRICULAR SEPTUM: 1.1 CM (ref 0.6–1.1)
LAAS-AP2: 25.6 CM2
LAAS-AP4: 18.8 CM2
LEFT ATRIUM SIZE: 3.4 CM
LEFT INTERNAL DIMENSION IN SYSTOLE: 3.1 CM (ref 2.1–4)
LEFT VENTRICULAR INTERNAL DIMENSION IN DIASTOLE: 4.7 CM (ref 3.5–6)
LEFT VENTRICULAR POSTERIOR WALL IN END DIASTOLE: 1 CM
LEFT VENTRICULAR STROKE VOLUME: 64 ML
LVSV (TEICH): 64 ML
MV E'TISSUE VEL-SEP: 10 CM/S
MV PEAK A VEL: 0.58 M/S
MV PEAK E VEL: 83 CM/S
MV STENOSIS PRESSURE HALF TIME: 44 MS
MV VALVE AREA P 1/2 METHOD: 5 CM2
RIGHT ATRIAL 2D VOLUME: 71 ML
RIGHT ATRIUM AREA SYSTOLE A4C: 21.6 CM2
RIGHT VENTRICLE ID DIMENSION: 4.9 CM
SINUS: 3.7 CM
SL CV LEFT ATRIUM LENGTH A2C: 6 CM
SL CV LV EF: 60
SL CV PED ECHO LEFT VENTRICLE DIASTOLIC VOLUME (MOD BIPLANE) 2D: 102 ML
SL CV PED ECHO LEFT VENTRICLE SYSTOLIC VOLUME (MOD BIPLANE) 2D: 38 ML
TR MAX PG: 18 MMHG
TR PEAK VELOCITY: 2.1 M/S
TRICUSPID VALVE PEAK REGURGITATION VELOCITY: 2.12 M/S

## 2022-05-27 PROCEDURE — 93306 TTE W/DOPPLER COMPLETE: CPT

## 2022-05-27 PROCEDURE — 93306 TTE W/DOPPLER COMPLETE: CPT | Performed by: INTERNAL MEDICINE

## 2022-06-01 ENCOUNTER — OFFICE VISIT (OUTPATIENT)
Dept: CARDIAC SURGERY | Facility: CLINIC | Age: 62
End: 2022-06-01
Payer: COMMERCIAL

## 2022-06-01 VITALS
WEIGHT: 214 LBS | RESPIRATION RATE: 18 BRPM | HEART RATE: 65 BPM | SYSTOLIC BLOOD PRESSURE: 134 MMHG | BODY MASS INDEX: 27.46 KG/M2 | OXYGEN SATURATION: 98 % | HEIGHT: 74 IN | DIASTOLIC BLOOD PRESSURE: 84 MMHG

## 2022-06-01 DIAGNOSIS — Z82.49 FH: HEART DISEASE: ICD-10-CM

## 2022-06-01 DIAGNOSIS — I71.2 ASCENDING AORTIC ANEURYSM (HCC): Primary | ICD-10-CM

## 2022-06-01 PROBLEM — I71.21 ASCENDING AORTIC ANEURYSM: Status: ACTIVE | Noted: 2022-06-01

## 2022-06-01 PROCEDURE — 99204 OFFICE O/P NEW MOD 45 MIN: CPT | Performed by: PHYSICIAN ASSISTANT

## 2022-06-01 PROCEDURE — 1036F TOBACCO NON-USER: CPT | Performed by: PHYSICIAN ASSISTANT

## 2022-06-01 PROCEDURE — 3008F BODY MASS INDEX DOCD: CPT | Performed by: PHYSICIAN ASSISTANT

## 2022-06-01 RX ORDER — OMEGA-3 FATTY ACIDS CAP DELAYED RELEASE 1000 MG 1000 MG
CAPSULE DELAYED RELEASE ORAL
COMMUNITY

## 2022-06-01 RX ORDER — PROPRANOLOL/HYDROCHLOROTHIAZID 40 MG-25MG
TABLET ORAL
COMMUNITY

## 2022-06-01 NOTE — PROGRESS NOTES
Consultation - Cardiothoracic Surgery   Netta Johnson 57 y o  male MRN: [id number]    Physician Requesting Consult: No att  providers found    Reason for Consult / Principal Problem: Aortic aneurysm    History of Present Illness: Netta Johnson is a 57y o  year old male who has a PMHx of cholelithiasis s/p laparoscopic cholecystectomy, b/l knee arthroscopy, rotator cuff repair who was seen in consultation today for aortic aneurysm  Patient was referred for a CT coronary calcium score by Dr Harman aLi which was obtained on 22  This revealed a calcium score of 3 as well as an incidental finding of mild ectasia of the ascending aorta measuring 41 mm  Patient was referred to our office by his PCP, and obtained an ECHO on 22 which revealed normal LVEF 60%, trileaflet aortic valve, no aortic stenosis or regurgitation, mild TR and normal aortic root with proximal ascending aorta measuring 3 5 cm  Today patient has no complaints  He states that he is very active - does [address], walks with his wife  He has not had any symptoms of fatigue, shortness of breath, dizziness, palpitations, LE edema, changes in weight  He takes only Flonase, and naproxen as needed, every couple of months  He states his dad  last year at the age of 80 due to CAD   He does not see a cardiologist      Past Medical History:  Past Medical History:   Diagnosis Date    Cholelithiasis        Past Surgical History:   Past Surgical History:   Procedure Laterality Date    APPENDECTOMY      CHOLANGIOGRAM N/A 2018    Procedure: CHOLANGIOGRAM;  Surgeon: Mark Mcbride MD;  [facility];  Service: General    CHOLECYSTECTOMY      CHOLECYSTECTOMY LAPAROSCOPIC N/A 2018    Procedure: CHOLECYSTECTOMY LAPAROSCOPIC;  Surgeon: Mark Mcbride MD;  [facility];  Service: General    KNEE ARTHROSCOPY      CO COLONOSCOPY FLX DX W/COLLJ SPEC WHEN PFRMD N/A 2017    Procedure: COLONOSCOPY;  Surgeon: Tank Judd Karla Yeager MD;  Location: [facility]; Service: Colorectal    ROTATOR CUFF REPAIR         Family History:  Family History   Problem Relation Age of Onset    Cancer Mother     Heart disease Father        Social History:  Social History     Substance and Sexual Activity   Alcohol Use Yes    Comment: socially     Social History     Substance and Sexual Activity   Drug Use No     Social History     Tobacco Use   Smoking Status Never Smoker   Smokeless Tobacco Never Used       Home Medications:   Prior to Admission medications    Medication Sig Start Date End Date Taking? Authorizing Provider   clotrimazole-betamethasone (Ng Tracy) 1-0 05 % cream Apply topically 2 (two) times a day 0/60/01  Yes Enoc Garcia MD   Omega-3 Fatty Acids (Fish Oil) 1000 MG CPDR Take by mouth   Yes Historical Provider, MD   Turmeric 500 MG CAPS Take by mouth   Yes Historical Provider, MD   Naproxen Sodium 220 MG CAPS Take by mouth  Patient not taking: No sig reported    Historical Provider, MD       Allergies:   Allergies   Allergen Reactions    Other Rash     Benzoin       Review of Systems:  Review of Systems - General ROS: negative  Psychological ROS: negative  Ophthalmic ROS: negative  ENT ROS: negative  Allergy and Immunology ROS: negative  Hematological and Lymphatic ROS: negative  Endocrine ROS: negative  Breast ROS: negative  Respiratory ROS: no cough, shortness of breath, or wheezing  Cardiovascular ROS: no chest pain or dyspnea on exertion  Gastrointestinal ROS: no abdominal pain, change in bowel habits, or black or bloody stools  Genito-Urinary ROS: no dysuria, trouble voiding, or hematuria  Musculoskeletal ROS: negative  Neurological ROS: no TIA or stroke symptoms  Dermatological ROS: negative    Vital Signs:     Vitals:    06/01/22 1016   BP: 134/84   BP Location: Left arm   Patient Position: Sitting   Cuff Size: Standard   Pulse: 65   Resp: 18   SpO2: 98%   Weight: 97 1 kg (214 lb)   Height: 6' 2" (1 88 m)       Physical Exam:    General: pleasant, NAD   HEENT/NECK:  PERRLA  No jugular venous distention  Cardiac:Regular rate and rhythm  Carotid arteries: no bruits  Pulmonary:  Breath sounds clear bilaterally  Abdomen:  Non-tender, Non-distended  Positive bowel sounds  Upper extremities: palpable radial pulses; brisk capillary refill  Lower extremities: Extremities warm/dry  PT/DP pulses 2+ bilaterally  No edema B/L  Neuro: Alert and oriented X 3  Sensation is grossly intact  No focal deficits  Musculoskeletal: full ROM in all extremities   Skin: Warm/Dry, without rashes or lesions  Lab Results:               Invalid input(s): LABGLOM      Lab Results   Component Value Date    HGBA1C 5 6 11/11/2021     Lab Results   Component Value Date    CKTOTAL 329 (H) 01/31/2018    CKMB 4 3 01/31/2018    CKMBINDEX 1 3 01/31/2018    TROPONINI <0 02 01/31/2018       Imaging Studies:         Echocardiogram: 5/27/2022  Study Details    This transthoracic echocardiogram was performed in the echo lab  This was a routine, outpatient study  Study quality was adequate  A complete 2D, color flow Doppler, spectral Doppler, 2D, color flow Doppler and spectral Doppler transthoracic echocardiogram was performed  The apical, parasternal, subcostal and suprasternal views were obtained  Indications  Priority: Routine  Dx: Ascending aortic aneurysm (HCC) [I71 2 (ICD-10-CM)]       History    GERD       Interpretation Summary         Left Ventricle: Left ventricular cavity size is normal  Wall thickness is normal  The left ventricular ejection fraction is 60%  Systolic function is normal  Wall motion is normal  Diastolic function is normal     Right Ventricle: Right ventricular cavity size is normal  Systolic function is normal     Tricuspid Valve: There is mild regurgitation  There is a small, strand-like, mobile, echodensity, on the ventricular aspect  It may represent accessory chordal tissue    Aorta:  The aortic root is normal in size  The proximal ascending aorta is normal in size, measuring 3 5 cm          Findings    Left Ventricle Left ventricular cavity size is normal  Wall thickness is normal  The left ventricular ejection fraction is 60%  Systolic function is normal   Wall motion is normal  Diastolic function is normal    Right Ventricle Right ventricular cavity size is normal  Systolic function is normal  Wall thickness is normal    Left Atrium The atrium is normal in size  Right Atrium The atrium is normal in size  Aortic Valve The aortic valve is trileaflet  The leaflets are not thickened  The leaflets are not calcified  The leaflets exhibit normal mobility  There is no evidence of regurgitation  The Aortic Valve has no significant stenosis  Mitral Valve The mitral valve has normal structure and function  There is trace regurgitation  There is no evidence of stenosis  Tricuspid Valve Tricuspid valve structure is normal  There is mild regurgitation  There is no evidence of stenosis  The right ventricular systolic pressure is normal  There is a small, strand-like, mobile, echodensity, on the ventricular aspect  It may represent accessory chordal tissue  Pulmonic Valve Pulmonic valve structure is normal  There is no evidence of regurgitation  There is no evidence of stenosis  Ascending Aorta The aortic root is normal in size  The proximal ascending aorta is normal in size, measuring 3 5 cm  IVC/SVC The inferior vena cava is normal in size  Respirophasic changes were normal    Pericardium There is no pericardial effusion  The pericardium is normal in appearance                 Left Ventricle Measurements    Function/Volumes   A4C EF 56 %         Dimensions   LVIDd 4 7 cm         LVIDS 3 1 cm         IVSd 1 1 cm         LVPWd 1 cm         FS 34 %         Diastolic Filling   MV E' Tissue Velocity Septal 10 cm/s         E wave deceleration time 151 ms         MV Peak E Prosper 83 cm/s         MV Peak A Prosper 0 58 m/s Right Ventricle Measurements    Dimensions   RVID d 4 9 cm               Left Atrium Measurements    Dimensions   LA size 3 4 cm         LA length (A2C) 6 cm               Right Atrium Measurements    Dimensions   RA 2D Volume 71 mL         RAA A4C 21 6 cm2               Mitral Valve Measurements    Stenosis   MV stenosis pressure 1/2 time 44 ms         MV valve area p 1/2 method 5 cm2               Tricuspid Valve Measurements    RVSP Parameters   TR Peak Prosper 2 1 m/s         Triscuspid Valve Regurgitation Peak Gradient 18 mmHg               Aorta Measurements    Aortic Dimensions   Ao root 3 4 cm         Sinus 3 7 cm         Asc Ao 3 5 cm                 CT Scan: 4/19/2022  IMPRESSION:     Total coronary calcium score equals 3  For more useful information regarding the prognostic significance of the calcium score, please consult the calculator at the website [email or web address]  aspx       Incidental finding of mild ectasia of the ascending aorta measuring 41 mm  Recommendation is for follow-up low radiation dose chest CT in one year  I have personally reviewed pertinent reports  Assessment:  Patient Active Problem List    Diagnosis Date Noted    FH: heart disease 06/01/2022    Ascending aortic aneurysm ([address] ) 06/01/2022    Elevated serum creatinine 01/13/2022    Tinea 01/13/2022    Acute right-sided low back pain with right-sided sciatica 04/26/2021    Cellulitis 12/02/2019    Physical exam 09/13/2018    Primary osteoarthritis of right knee 07/23/2018    Acute cholecystitis 01/31/2018    Cholecystitis 01/31/2018    Gastroesophageal reflux disease without esophagitis 01/29/2018    Attention deficit disorder 12/03/2012     Ascending Aortic Aneurysm    Plan:  Reviewed ascending aortic aneurysm history and disease course with the patient     - Will refer to Cardiology   - Follow up with non-contrast CT and office visit in one year    Education was provided in regards to the followin )  Maintaining good blood pressure control and taking antihypertensive medications as prescribed  Goal BP is < 120/80  2 )  No strenuous activity involving lifting more than 50lbs  3 )  Awareness of the warning symptoms of aortic dissection such as chest pain, back pain, shortness of breath, lightheadedness or dizziness and to seek immediate medical attention at the nearest ER   4 )  The importance of continued surveillance with visits in the Aortic Disease clinic and obtaining CT Scans as recommended  5 ) Importance of avoiding tobacco products  Kristina Panchal was comfortable with our recommendations, and their questions were answered to their satisfaction  Thank you for allowing us to participate in the care of this patient  The patient was referred to gastroenterology for consideration of routine colonoscopy screening of all patients aged 54-65  Gastroenterology evaluation will not be necessary prior to any open heart procedures, and can be completed following surgical recovery      SIGNATURE: Dusty Dunn PA-C  DATE: 2022  TIME: 11:19 AM

## 2022-06-02 ENCOUNTER — VBI (OUTPATIENT)
Dept: ADMINISTRATIVE | Facility: OTHER | Age: 62
End: 2022-06-02

## 2022-06-02 NOTE — TELEPHONE ENCOUNTER
06/02/22 10:12 AM     See documentation in the [employer] SmartForm       Ric Rasmussen 28-Mar-2022 18:30

## 2022-09-28 ENCOUNTER — ANESTHESIA (OUTPATIENT)
Dept: GASTROENTEROLOGY | Facility: HOSPITAL | Age: 62
End: 2022-09-28

## 2022-09-28 ENCOUNTER — ANESTHESIA EVENT (OUTPATIENT)
Dept: GASTROENTEROLOGY | Facility: HOSPITAL | Age: 62
End: 2022-09-28

## 2022-09-28 ENCOUNTER — HOSPITAL ENCOUNTER (OUTPATIENT)
Dept: GASTROENTEROLOGY | Facility: HOSPITAL | Age: 62
Setting detail: OUTPATIENT SURGERY
Discharge: HOME | End: 2022-09-28
Attending: COLON & RECTAL SURGERY | Admitting: COLON & RECTAL SURGERY
Payer: COMMERCIAL

## 2022-09-28 VITALS
WEIGHT: 205 LBS | RESPIRATION RATE: 17 BRPM | HEIGHT: 74 IN | TEMPERATURE: 96.7 F | BODY MASS INDEX: 26.31 KG/M2 | DIASTOLIC BLOOD PRESSURE: 83 MMHG | HEART RATE: 61 BPM | OXYGEN SATURATION: 98 % | SYSTOLIC BLOOD PRESSURE: 140 MMHG

## 2022-09-28 DIAGNOSIS — Z80.0 FAMILY HISTORY OF COLON CANCER: ICD-10-CM

## 2022-09-28 PROCEDURE — 99213 OFFICE O/P EST LOW 20 MIN: CPT | Performed by: COLON & RECTAL SURGERY

## 2022-09-28 PROCEDURE — 45385 COLONOSCOPY W/LESION REMOVAL: CPT | Performed by: COLON & RECTAL SURGERY

## 2022-09-28 PROCEDURE — 88305 TISSUE EXAM BY PATHOLOGIST: CPT | Performed by: SPECIALIST

## 2022-09-28 RX ORDER — SODIUM CHLORIDE 9 MG/ML
INJECTION, SOLUTION INTRAVENOUS CONTINUOUS PRN
Status: DISCONTINUED | OUTPATIENT
Start: 2022-09-28 | End: 2022-09-28

## 2022-09-28 RX ORDER — PROPOFOL 10 MG/ML
INJECTION, EMULSION INTRAVENOUS CONTINUOUS PRN
Status: DISCONTINUED | OUTPATIENT
Start: 2022-09-28 | End: 2022-09-28

## 2022-09-28 RX ORDER — PROPOFOL 10 MG/ML
INJECTION, EMULSION INTRAVENOUS AS NEEDED
Status: DISCONTINUED | OUTPATIENT
Start: 2022-09-28 | End: 2022-09-28

## 2022-09-28 RX ORDER — LIDOCAINE HYDROCHLORIDE 10 MG/ML
INJECTION, SOLUTION EPIDURAL; INFILTRATION; INTRACAUDAL; PERINEURAL AS NEEDED
Status: DISCONTINUED | OUTPATIENT
Start: 2022-09-28 | End: 2022-09-28

## 2022-09-28 RX ADMIN — PROPOFOL 120 MCG/KG/MIN: 10 INJECTION, EMULSION INTRAVENOUS at 12:15

## 2022-09-28 RX ADMIN — SODIUM CHLORIDE: 9 INJECTION, SOLUTION INTRAVENOUS at 12:14

## 2022-09-28 RX ADMIN — PROPOFOL 100 MG: 10 INJECTION, EMULSION INTRAVENOUS at 12:15

## 2022-09-28 RX ADMIN — LIDOCAINE HYDROCHLORIDE 50 MG: 10 INJECTION, SOLUTION EPIDURAL; INFILTRATION; INTRACAUDAL; PERINEURAL at 12:15

## 2022-09-28 NOTE — ANESTHESIA POSTPROCEDURE EVALUATION
Post-Op Assessment Note    CV Status:  Stable  Pain Score: 0    Pain management: adequate     Mental Status:  Alert and awake   Hydration Status:  Euvolemic   PONV Controlled:  Controlled   Airway Patency:  Patent      Post Op Vitals Reviewed: Yes      Staff: Anesthesiologist, CRNA         No complications documented      BP   118/72   Temp  96 7   Pulse  71   Resp   14   SpO2 98

## 2022-09-28 NOTE — ANESTHESIA PREPROCEDURE EVALUATION
Procedure:  COLONOSCOPY    Relevant Problems   CARDIO   (+) Ascending aortic aneurysm      GI/HEPATIC   (+) Gastroesophageal reflux disease without esophagitis      MUSCULOSKELETAL   (+) Primary osteoarthritis of right knee      Other   (+) Attention deficit disorder      Findings    Left Ventricle Left ventricular cavity size is normal  Wall thickness is normal  The left ventricular ejection fraction is 60%  Systolic function is normal   Wall motion is normal  Diastolic function is normal    Right Ventricle Right ventricular cavity size is normal  Systolic function is normal  Wall thickness is normal    Left Atrium The atrium is normal in size  Right Atrium The atrium is normal in size  Aortic Valve The aortic valve is trileaflet  The leaflets are not thickened  The leaflets are not calcified  The leaflets exhibit normal mobility  There is no evidence of regurgitation  The Aortic Valve has no significant stenosis  Mitral Valve The mitral valve has normal structure and function  There is trace regurgitation  There is no evidence of stenosis  Tricuspid Valve Tricuspid valve structure is normal  There is mild regurgitation  There is no evidence of stenosis  The right ventricular systolic pressure is normal  There is a small, strand-like, mobile, echodensity, on the ventricular aspect  It may represent accessory chordal tissue  Pulmonic Valve Pulmonic valve structure is normal  There is no evidence of regurgitation  There is no evidence of stenosis  Ascending Aorta The aortic root is normal in size  The proximal ascending aorta is normal in size, measuring 3 5 cm  IVC/SVC The inferior vena cava is normal in size  Respirophasic changes were normal    Pericardium There is no pericardial effusion   The pericardium is normal in appearance       Physical Exam    Airway    Mallampati score: II  TM Distance: >3 FB  Neck ROM: full     Dental       Cardiovascular  Rhythm: regular, Rate: normal, Cardiovascular exam normal    Pulmonary  Pulmonary exam normal Breath sounds clear to auscultation,     Other Findings        Anesthesia Plan  ASA Score- 2     Anesthesia Type- IV sedation with anesthesia with ASA Monitors  Additional Monitors:   Airway Plan:           Plan Factors-Exercise tolerance (METS): >4 METS  Chart reviewed  EKG reviewed  Existing labs reviewed  Patient summary reviewed  Patient is not a current smoker  Induction-     Postoperative Plan-     Informed Consent- Anesthetic plan and risks discussed with patient  I personally reviewed this patient with the CRNA  Discussed and agreed on the Anesthesia Plan with the CRNA  Tanvi Gonzáles

## 2022-09-28 NOTE — H&P
History and Physical   Colon and Rectal Surgery   Reece Cody 57 y o  male MRN: [id number]  Unit/Bed#:  Encounter: [id number]  09/28/22   12:07 PM      CC:  Family polyp history  History of Present Illness   HPI:  Reece Cody is a 57 y o  male for high risk screening  Historical Information   Past Medical History:   Diagnosis Date    Cholelithiasis      Past Surgical History:   Procedure Laterality Date    APPENDECTOMY      CHOLANGIOGRAM N/A 1/31/2018    Procedure: CHOLANGIOGRAM;  Surgeon: Dewayne Gill MD;  [facility];  Service: General    CHOLECYSTECTOMY      CHOLECYSTECTOMY LAPAROSCOPIC N/A 1/31/2018    Procedure: Godoy Moros;  Surgeon: Dewayne Gill MD;  [facility];  Service: General    KNEE ARTHROSCOPY      IN COLONOSCOPY FLX DX W/COLLJ SPEC WHEN PFRMD N/A 7/19/2017    Procedure: COLONOSCOPY;  Surgeon: Ghassan Rollins MD;  [facility];   Service: Colorectal    ROTATOR CUFF REPAIR         Meds/Allergies     (Not in a hospital admission)        Current Outpatient Medications:     Omega-3 Fatty Acids (Fish Oil) 1000 MG CPDR, Take by mouth, Disp: , Rfl:     Turmeric 500 MG CAPS, Take by mouth, Disp: , Rfl:     clotrimazole-betamethasone (LOTRISONE) 1-0 05 % cream, Apply topically 2 (two) times a day, Disp: 45 g, Rfl: 3    Naproxen Sodium 220 MG CAPS, Take by mouth (Patient not taking: No sig reported), Disp: , Rfl:     Allergies   Allergen Reactions    Other Rash     Benzoin         Social History   Social History     Substance and Sexual Activity   Alcohol Use Yes    Comment: 3x a week     Social History     Substance and Sexual Activity   Drug Use No     Social History     Tobacco Use   Smoking Status Never Smoker   Smokeless Tobacco Never Used         Family History:   Family History   Problem Relation Age of Onset    Cancer Mother     Heart disease Father      Review of Systems - General ROS: negative  Respiratory ROS: negative  Cardiovascular ROS: negative     Objective     Current Vitals:      No intake or output data in the 24 hours ending 09/28/22 1207    Physical Exam:  General:  Well nourished, no distress  Neuro: Alert and oriented  Eyes:Sclera anicteric, conjunctiva pink  Pulm: Clear to auscultation bilaterally  No respiratory Distress  CV:  Regular rate and rhythm  No murmurs  Abdomen:  Soft, flat, non-tender, without masses or hepatosplenomegaly  Lab Results:       ASSESSMENT:  Joellen Guillermo is a 57 y o  male for screening  PLAN:  Colonoscopy  Risks , including, but not limited to, bleeding, perforation, missed lesions, and potential need for surgery, were reviewed  Alternatives to colonoscopy were discussed  Potential added risk for Covid history on 9/13 were reviewed  His only symptom was a runny nose and he is asymptomatic now  We discussed the potential added risk, even death, and he wants to proceed with the colonoscopy  Risks are limited unless perforation or need for surgery and are minimal for him, even then  He understands and wants to proceed rather than re-prep and re-schedule  Anesthesiology agrees with this plan      Lewis Cisneros MD

## 2022-10-05 PROCEDURE — 88305 TISSUE EXAM BY PATHOLOGIST: CPT | Performed by: SPECIALIST

## 2023-08-15 ENCOUNTER — TRANSCRIBE ORDERS (OUTPATIENT)
Dept: CARDIAC SURGERY | Facility: CLINIC | Age: 63
End: 2023-08-15

## 2023-08-15 DIAGNOSIS — I71.21 ASCENDING AORTIC ANEURYSM, UNSPECIFIED WHETHER RUPTURED (HCC): Primary | ICD-10-CM

## 2023-08-22 ENCOUNTER — HOSPITAL ENCOUNTER (OUTPATIENT)
Dept: RADIOLOGY | Facility: HOSPITAL | Age: 63
Discharge: HOME | End: 2023-08-22
Payer: COMMERCIAL

## 2023-08-22 DIAGNOSIS — I71.21 ASCENDING AORTIC ANEURYSM, UNSPECIFIED WHETHER RUPTURED (HCC): ICD-10-CM

## 2023-08-22 PROCEDURE — G1004 CDSM NDSC: HCPCS

## 2023-08-22 PROCEDURE — 71250 CT THORAX DX C-: CPT

## 2023-08-30 ENCOUNTER — OFFICE VISIT (OUTPATIENT)
Dept: CARDIAC SURGERY | Facility: CLINIC | Age: 63
End: 2023-08-30
Payer: COMMERCIAL

## 2023-08-30 VITALS
WEIGHT: 218.9 LBS | TEMPERATURE: 97.7 F | DIASTOLIC BLOOD PRESSURE: 83 MMHG | SYSTOLIC BLOOD PRESSURE: 121 MMHG | HEART RATE: 69 BPM | OXYGEN SATURATION: 97 % | HEIGHT: 74 IN | BODY MASS INDEX: 28.09 KG/M2

## 2023-08-30 DIAGNOSIS — I77.810 AORTIC ECTASIA, THORACIC (HCC): Primary | ICD-10-CM

## 2023-08-30 PROCEDURE — 99214 OFFICE O/P EST MOD 30 MIN: CPT | Performed by: THORACIC SURGERY (CARDIOTHORACIC VASCULAR SURGERY)

## 2023-08-30 NOTE — LETTER
August 30, 6054     Marsha Brower, [address] [facility] [address]    Patient: Donald Cruz   YOB: 1960   Date of Visit: 8/30/2023       Dear Dr. Virgen Hassan: Thank you for referring Cherie Guzman to me for evaluation. Below are my notes for this consultation. If you have questions, please do not hesitate to call me. I look forward to following your patient along with you. Sincerely,        Jazz Cameron, DO        CC: No Recipients    VIVEK Reyez  8/30/2023  3:42 PM  Attested  Aortic Clinic  Donald Cruz 60 y.o. male MRN: [id number]      Reason for Consult / Principal Problem: Ascending Aortic Ectasia    History of Present Illness: Donald Cruz is a 60y.o. year old male who presents today for ongoing surveillance of ascending aortic ectasia. This was initially identified on screening CT Calcium Score Scan April 2022. His calcium score = 3 and also demonstrated an incidental finding or aortic ectasia measuring 4 cm. Echo May 2022 demonstrates a trileaflet aortic valve with normal function, normal root and maximal ascending aortic diameter of 3.5 cm. Patient was seen in our aortic clinic June 2022 for consultation. Patient's medical history is notable for osteoarthritis s/p knee and shoulder surgeries. Patient returns today for a 1 year follow-up with repeat non-contrast chest CT scan. Upon interview today patient reports his has been well other than arthritis issues with his shoulders and knee. He denies chest pain, SOB, lightheadedness, palpitations, fatigue, upper back pain or abdominal pain. He no longer does CrossFit workouts (due to arthritis) but does walk on a regular basis. BP normal  Patient does not smoke. NO FH of aneurysms.       Past Medical History:  Past Medical History:   Diagnosis Date   • Cholelithiasis          Past Surgical History:   Past Surgical History:   Procedure Laterality Date   • APPENDECTOMY     • CHOLANGIOGRAM N/A 01/31/2018    Procedure: CHOLANGIOGRAM;  Surgeon: Daysi Nguyen MD;  Location: [facility];  Service: General   • CHOLECYSTECTOMY     • CHOLECYSTECTOMY LAPAROSCOPIC N/A 01/31/2018    Procedure: Katarzyna Linda;  Surgeon: Daysi Nguyen MD;  Location: [facility];  Service: General   • COLONOSCOPY  09/28/2022   • KNEE ARTHROSCOPY     • AL COLONOSCOPY FLX DX W/COLLJ SPEC WHEN PFRMD N/A 07/19/2017    Procedure: COLONOSCOPY;  Surgeon: Tiffanie Chaney MD;  Location: [facility]; Service: Colorectal   • ROTATOR CUFF REPAIR           Family History:  Family History   Problem Relation Age of Onset   • Cancer Mother    • Heart disease Father          Social History:    Social History     Substance and Sexual Activity   Alcohol Use Yes    Comment: 3x a week     Social History     Substance and Sexual Activity   Drug Use No     Social History     Tobacco Use   Smoking Status Never   Smokeless Tobacco Never         Home Medications:   Prior to Admission medications    Medication Sig Start Date End Date Taking? Authorizing Provider   aspirin (ECOTRIN LOW STRENGTH) 81 mg EC tablet Take 81 mg by mouth daily   Yes Historical Provider, MD   Naproxen Sodium 220 MG CAPS Take by mouth   Yes Historical Provider, MD   Omega-3 Fatty Acids (Fish Oil) 1000 MG CPDR Take by mouth   Yes Historical Provider, MD   Turmeric 500 MG CAPS Take by mouth   Yes Historical Provider, MD   clotrimazole-betamethasone (LOTRISONE) 1-0.05 % cream Apply topically 2 (two) times a day 1/13/22 9/96/13  Burton Rosales MD       Allergies:   Allergies   Allergen Reactions   • Other Rash     Benzoin       Review of Systems:   Review of Systems - History obtained from chart review and the patient  General ROS: negative  Psychological ROS: negative  Ophthalmic ROS: negative  ENT ROS: negative  Allergy and Immunology ROS: negative  Hematological and Lymphatic ROS: negative  Endocrine ROS: negative  Breast ROS: negative  Respiratory ROS: no cough, shortness of breath, or wheezing  Cardiovascular ROS: no chest pain or dyspnea on exertion  Gastrointestinal ROS: no abdominal pain, change in bowel habits, or black or bloody stools  Genito-Urinary ROS: no dysuria, trouble voiding, or hematuria  Musculoskeletal ROS: positive for - shoulder and knee pain (arthritis)   Neurological ROS: no TIA or stroke symptoms  Dermatological ROS: negative    Vital Signs:   Vitals:    08/30/23 1501 08/30/23 1504   BP: 133/89 121/83   BP Location: Left arm Right arm   Patient Position: Sitting Sitting   Cuff Size: Standard Standard   Pulse: 69    Temp: 97.7 °F (36.5 °C)    TempSrc: Tympanic    SpO2: 97%    Weight: 99.3 kg (218 lb 14.4 oz)    Height: 6' 2" (1.88 m)        Physical Exam:  General: Alert, oriented, well developed, no acute distress  HEENT/NECK:  PERRLA. No jugular venous distention. Cardiac:Regular rate and rhythm, no murmurs rubs or gallops. Carotid arteries: 2+ pulses,no bruits  Pulmonary:  Breath sounds clear bilaterally. Abdomen:  Non-tender, Non-distended. Positive bowel sounds. Upper extremities: 2+ radial pulses; brisk capillary refill  Lower extremities: Extremities warm/dry. PT/DP pulses 2+ bilaterally. No edema B/L  Neuro: Alert and oriented X 3. Sensation is grossly intact. No focal deficits. Musculoskeletal: MAEE, stable gait  Skin: Warm/Dry, without rashes or lesions. Lab Results:     Lab Results   Component Value Date    HGBA1C 5.6 11/11/2021     Lab Results   Component Value Date    CKTOTAL 329 (H) 01/31/2018    CKMB 4.3 01/31/2018    CKMBINDEX 1.3 01/31/2018    TROPONINI <0.02 01/31/2018       Imaging Studies:     CT Chest: 8/22/23    Stable 4 cm ectasia of the ascending aorta. Echocardiogram: 5/27/22    Aortic Valve The aortic valve is trileaflet. The leaflets are not thickened. The leaflets are not calcified. The leaflets exhibit normal mobility. There is no evidence of regurgitation.  The Aortic Valve has no significant stenosis. Ascending Aorta The aortic root is normal in size. The proximal ascending aorta is normal in size, measuring 3.5 cm. I have personally reviewed pertinent films in PACS     PCP notes reviewed      Assessment:  Patient Active Problem List    Diagnosis Date Noted   • FH: heart disease 06/01/2022   • Aortic ectasia, thoracic ([address]) 06/01/2022   • Elevated serum creatinine 01/13/2022   • Tinea 01/13/2022   • Acute right-sided low back pain with right-sided sciatica 04/26/2021   • Cellulitis 12/02/2019   • Physical exam 09/13/2018   • Primary osteoarthritis of right knee 07/23/2018   • Acute cholecystitis 01/31/2018   • Cholecystitis 01/31/2018   • Gastroesophageal reflux disease without esophagitis 01/29/2018   • Attention deficit disorder 12/03/2012         Impression/Plan:    Ascending Aortic Ectasia- 4 cm maximal diameter, not aneurysmal     1) Normal trileaflet valve with normal function and root. 2) Normotensive   3) No FH aneurysms   4) Non-smoker   5) Given patient's stature; height 6'2", 218 lb, the size of his aorta not a significant finding. No further imaging or follow up in aortic clinic necessary. CT findings were confirmed and shared with the patient today. Denise Palmer was comfortable with our recommendations, and his questions were answered to his satisfaction. in or shortness of breath. The patient recently had a screening colonoscopy in 9/28/22. Therefore GI referral is not indicated at this time. VIVEK Campo  DATE: August 30, 2023  TIME: 3:40 PM  Attestation signed by Marielena Hale DO at 8/30/2023  3:54 PM:  I supervised the Advanced Practitioner. ? I performed, in its entirety, the assessment/plan component of the visit.   I agree with the Advanced Practitioner's note with the following additions/exceptions:      Mr. Ned Burk was seen back in the office today for ongoing surveillance of his a sending aortic ectasia. This is initially found on a calcium score for screening in 2022. His most recent CT scan was reviewed by myself personally findings shared with him. This demonstrates a stable 39 x 40 mm ascending aorta. At this point given his height and BSA, I feel he needs no further follow-up. This demonstrates aortic ectasia. His aortic root on echocardiography was also normal with a trileaflet valve. I am happy to see him back in future if necessary.     Marielena Hale,  08/30/23

## 2023-08-30 NOTE — PROGRESS NOTES
Aortic Clinic  Juliette Wells 60 y.o. male MRN: [id number]      Reason for Consult / Principal Problem: Ascending Aortic Ectasia    History of Present Illness: Juliette Wells is a 60y.o. year old male who presents today for ongoing surveillance of ascending aortic ectasia. This was initially identified on screening CT Calcium Score Scan April 2022. His calcium score = 3 and also demonstrated an incidental finding or aortic ectasia measuring 4 cm. Echo May 2022 demonstrates a trileaflet aortic valve with normal function, normal root and maximal ascending aortic diameter of 3.5 cm. Patient was seen in our aortic clinic June 2022 for consultation. Patient's medical history is notable for osteoarthritis s/p knee and shoulder surgeries. Patient returns today for a 1 year follow-up with repeat non-contrast chest CT scan. Upon interview today patient reports his has been well other than arthritis issues with his shoulders and knee. He denies chest pain, SOB, lightheadedness, palpitations, fatigue, upper back pain or abdominal pain. He no longer does Cross[employer] workouts (due to arthritis) but does walk on a regular basis. BP normal  Patient does not smoke. NO FH of aneurysms. Past Medical History:  Past Medical History:   Diagnosis Date   • Cholelithiasis          Past Surgical History:   Past Surgical History:   Procedure Laterality Date   • APPENDECTOMY     • CHOLANGIOGRAM N/A 01/31/2018    Procedure: CHOLANGIOGRAM;  Surgeon: Marce Centeno MD;  [facility];  Service: General   • CHOLECYSTECTOMY     • CHOLECYSTECTOMY LAPAROSCOPIC N/A 01/31/2018    Procedure: CHOLECYSTECTOMY LAPAROSCOPIC;  Surgeon: Marce Centeno MD;  [facility];  Service: General   • COLONOSCOPY  09/28/2022   • KNEE ARTHROSCOPY     • IN COLONOSCOPY FLX DX W/COLLJ SPEC WHEN PFRMD N/A 07/19/2017    Procedure: COLONOSCOPY;  Surgeon: Usman Lr MD;  Location: [facility];   Service: Colorectal   • ROTATOR CUFF REPAIR           Family History:  Family History   Problem Relation Age of Onset   • Cancer Mother    • Heart disease Father          Social History:    Social History     Substance and Sexual Activity   Alcohol Use Yes    Comment: 3x a week     Social History     Substance and Sexual Activity   Drug Use No     Social History     Tobacco Use   Smoking Status Never   Smokeless Tobacco Never         Home Medications:   Prior to Admission medications    Medication Sig Start Date End Date Taking? Authorizing Provider   aspirin (ECOTRIN LOW STRENGTH) 81 mg EC tablet Take 81 mg by mouth daily   Yes Historical Provider, MD   Naproxen Sodium 220 MG CAPS Take by mouth   Yes Historical Provider, MD   Omega-3 Fatty Acids (Fish Oil) 1000 MG CPDR Take by mouth   Yes Historical Provider, MD   Turmeric 500 MG CAPS Take by mouth   Yes Historical Provider, MD   clotrimazole-betamethasone (LOTRISONE) 1-0.05 % cream Apply topically 2 (two) times a day 1/13/22 6/87/33  Ariel Tucker MD       Allergies:   Allergies   Allergen Reactions   • Other Rash     Benzoin       Review of Systems:   Review of Systems - History obtained from chart review and the patient  General ROS: negative  Psychological ROS: negative  Ophthalmic ROS: negative  ENT ROS: negative  Allergy and Immunology ROS: negative  Hematological and Lymphatic ROS: negative  Endocrine ROS: negative  Breast ROS: negative  Respiratory ROS: no cough, shortness of breath, or wheezing  Cardiovascular ROS: no chest pain or dyspnea on exertion  Gastrointestinal ROS: no abdominal pain, change in bowel habits, or black or bloody stools  Genito-Urinary ROS: no dysuria, trouble voiding, or hematuria  Musculoskeletal ROS: positive for - shoulder and knee pain (arthritis)   Neurological ROS: no TIA or stroke symptoms  Dermatological ROS: negative    Vital Signs:   Vitals:    08/30/23 1501 08/30/23 1504   BP: 133/89 121/83   BP Location: Left arm Right arm   Patient Position: Sitting Sitting   Cuff Size: Standard Standard   Pulse: 69    Temp: 97.7 °F (36.5 °C)    TempSrc: Tympanic    SpO2: 97%    Weight: 99.3 kg (218 lb 14.4 oz)    Height: 6' 2" (1.88 m)        Physical Exam:  General: Alert, oriented, well developed, no acute distress  HEENT/NECK:  PERRLA. No jugular venous distention. Cardiac:Regular rate and rhythm, no murmurs rubs or gallops. Carotid arteries: 2+ pulses,no bruits  Pulmonary:  Breath sounds clear bilaterally. Abdomen:  Non-tender, Non-distended. Positive bowel sounds. Upper extremities: 2+ radial pulses; brisk capillary refill  Lower extremities: Extremities warm/dry. PT/DP pulses 2+ bilaterally. No edema B/L  Neuro: Alert and oriented X 3. Sensation is grossly intact. No focal deficits. Musculoskeletal: MAEE, stable gait  Skin: Warm/Dry, without rashes or lesions. Lab Results:     Lab Results   Component Value Date    HGBA1C 5.6 11/11/2021     Lab Results   Component Value Date    CKTOTAL 329 (H) 01/31/2018    CKMB 4.3 01/31/2018    CKMBINDEX 1.3 01/31/2018    TROPONINI <0.02 01/31/2018       Imaging Studies:     CT Chest: 8/22/23    Stable 4 cm ectasia of the ascending aorta.     Echocardiogram: 5/27/22    Aortic Valve The aortic valve is trileaflet. The leaflets are not thickened. The leaflets are not calcified. The leaflets exhibit normal mobility. There is no evidence of regurgitation. The Aortic Valve has no significant stenosis. Ascending Aorta The aortic root is normal in size. The proximal ascending aorta is normal in size, measuring 3.5 cm.          I have personally reviewed pertinent films in PACS     PCP notes reviewed      Assessment:  Patient Active Problem List    Diagnosis Date Noted   • FH: heart disease 06/01/2022   • Aortic ectasia, thoracic ([address]) 06/01/2022   • Elevated serum creatinine 01/13/2022   • Tinea 01/13/2022   • Acute right-sided low back pain with right-sided sciatica 04/26/2021   • Cellulitis 12/02/2019   • Physical exam 09/13/2018   • Primary osteoarthritis of right knee 07/23/2018   • Acute cholecystitis 01/31/2018   • Cholecystitis 01/31/2018   • Gastroesophageal reflux disease without esophagitis 01/29/2018   • Attention deficit disorder 12/03/2012         Impression/Plan:    Ascending Aortic Ectasia- 4 cm maximal diameter, not aneurysmal     1) Normal trileaflet valve with normal function and root. 2) Normotensive   3) No FH aneurysms   4) Non-smoker   5) Given patient's stature; height 6'2", 218 lb, the size of his aorta not a significant finding. No further imaging or follow up in aortic clinic necessary. CT findings were confirmed and shared with the patient today. Jackson Rizwana was comfortable with our recommendations, and his questions were answered to his satisfaction. in or shortness of breath. The patient recently had a screening colonoscopy in 9/28/22. Therefore GI referral is not indicated at this time.      SIGNATURE: VIVEK Dean  DATE: August 30, 2023  TIME: 3:40 PM

## 2024-06-17 ENCOUNTER — OFFICE VISIT (OUTPATIENT)
Dept: FAMILY MEDICINE CLINIC | Facility: CLINIC | Age: 64
End: 2024-06-17
Payer: COMMERCIAL

## 2024-06-17 ENCOUNTER — APPOINTMENT (OUTPATIENT)
Dept: LAB | Facility: CLINIC | Age: 64
End: 2024-06-17
Payer: COMMERCIAL

## 2024-06-17 VITALS
BODY MASS INDEX: 28.17 KG/M2 | WEIGHT: 219.5 LBS | SYSTOLIC BLOOD PRESSURE: 120 MMHG | HEIGHT: 74 IN | DIASTOLIC BLOOD PRESSURE: 70 MMHG | OXYGEN SATURATION: 96 % | RESPIRATION RATE: 17 BRPM | TEMPERATURE: 98.2 F | HEART RATE: 80 BPM

## 2024-06-17 DIAGNOSIS — R53.83 OTHER FATIGUE: Primary | ICD-10-CM

## 2024-06-17 DIAGNOSIS — R35.1 NOCTURIA: ICD-10-CM

## 2024-06-17 DIAGNOSIS — R53.83 OTHER FATIGUE: ICD-10-CM

## 2024-06-17 PROBLEM — L03.90 CELLULITIS: Status: RESOLVED | Noted: 2019-12-02 | Resolved: 2024-06-17

## 2024-06-17 LAB
ALBUMIN SERPL BCP-MCNC: 4 G/DL (ref 3.5–5)
ALP SERPL-CCNC: 102 U/L (ref 34–104)
ALT SERPL W P-5'-P-CCNC: 37 U/L (ref 7–52)
ANION GAP SERPL CALCULATED.3IONS-SCNC: 9 MMOL/L (ref 4–13)
AST SERPL W P-5'-P-CCNC: 83 U/L (ref 13–39)
BILIRUB SERPL-MCNC: 1.16 MG/DL (ref 0.2–1)
BUN SERPL-MCNC: 15 MG/DL (ref 5–25)
CALCIUM SERPL-MCNC: 9 MG/DL (ref 8.4–10.2)
CHLORIDE SERPL-SCNC: 95 MMOL/L (ref 96–108)
CO2 SERPL-SCNC: 29 MMOL/L (ref 21–32)
CREAT SERPL-MCNC: 1.01 MG/DL (ref 0.6–1.3)
ERYTHROCYTE [DISTWIDTH] IN BLOOD BY AUTOMATED COUNT: 13.2 % (ref 11.6–15.1)
GFR SERPL CREATININE-BSD FRML MDRD: 78 ML/MIN/1.73SQ M
GLUCOSE P FAST SERPL-MCNC: 108 MG/DL (ref 65–99)
HCT VFR BLD AUTO: 39.1 % (ref 36.5–49.3)
HGB BLD-MCNC: 13.4 G/DL (ref 12–17)
MCH RBC QN AUTO: 30.7 PG (ref 26.8–34.3)
MCHC RBC AUTO-ENTMCNC: 34.3 G/DL (ref 31.4–37.4)
MCV RBC AUTO: 90 FL (ref 82–98)
PLATELET # BLD AUTO: 42 THOUSANDS/UL (ref 149–390)
PMV BLD AUTO: 11.2 FL (ref 8.9–12.7)
POTASSIUM SERPL-SCNC: 4.3 MMOL/L (ref 3.5–5.3)
PROT SERPL-MCNC: 6.8 G/DL (ref 6.4–8.4)
PSA SERPL-MCNC: 2.08 NG/ML (ref 0–4)
RBC # BLD AUTO: 4.37 MILLION/UL (ref 3.88–5.62)
SL AMB  POCT GLUCOSE, UA: ABNORMAL
SL AMB LEUKOCYTE ESTERASE,UA: ABNORMAL
SL AMB POCT BILIRUBIN,UA: ABNORMAL
SL AMB POCT BLOOD,UA: ABNORMAL
SL AMB POCT CLARITY,UA: ABNORMAL
SL AMB POCT COLOR,UA: ABNORMAL
SL AMB POCT KETONES,UA: ABNORMAL
SL AMB POCT NITRITE,UA: ABNORMAL
SL AMB POCT PH,UA: 6
SL AMB POCT SPECIFIC GRAVITY,UA: 1.01
SL AMB POCT URINE PROTEIN: ABNORMAL
SL AMB POCT UROBILINOGEN: ABNORMAL
SODIUM SERPL-SCNC: 133 MMOL/L (ref 135–147)
WBC # BLD AUTO: 2.2 THOUSAND/UL (ref 4.31–10.16)

## 2024-06-17 PROCEDURE — 36415 COLL VENOUS BLD VENIPUNCTURE: CPT

## 2024-06-17 PROCEDURE — 86618 LYME DISEASE ANTIBODY: CPT

## 2024-06-17 PROCEDURE — 80053 COMPREHEN METABOLIC PANEL: CPT

## 2024-06-17 PROCEDURE — 85027 COMPLETE CBC AUTOMATED: CPT

## 2024-06-17 PROCEDURE — 87086 URINE CULTURE/COLONY COUNT: CPT | Performed by: FAMILY MEDICINE

## 2024-06-17 PROCEDURE — 99213 OFFICE O/P EST LOW 20 MIN: CPT | Performed by: FAMILY MEDICINE

## 2024-06-17 PROCEDURE — 81002 URINALYSIS NONAUTO W/O SCOPE: CPT | Performed by: FAMILY MEDICINE

## 2024-06-17 PROCEDURE — G0103 PSA SCREENING: HCPCS

## 2024-06-17 RX ORDER — AMOXICILLIN 500 MG/1
500 TABLET, FILM COATED ORAL 3 TIMES DAILY
COMMUNITY
Start: 2024-06-10

## 2024-06-17 RX ORDER — OXYCODONE HYDROCHLORIDE AND ACETAMINOPHEN 5; 325 MG/1; MG/1
1 TABLET ORAL
COMMUNITY
Start: 2024-06-10

## 2024-06-17 NOTE — PROGRESS NOTES
FAMILY PRACTICE OFFICE VISIT       NAME: Dwayne Dai  AGE: 63 y.o. SEX: male       : 1960        MRN: [id number]    DATE: 2024  TIME: 6:18 AM    Assessment and Plan     Problem List Items Addressed This Visit       Other fatigue - Primary     Fatigue.  I suspect patient may have a viral infection however we will check blood work as ordered for further evaluation including Lyme disease.  He will continue to stay well-hydrated with electrolyte drinks and call if he develops any new symptoms.         Relevant Orders    POCT urine dip    Urine culture    PSA, Total Screen (Completed)    Lyme Total AB W Reflex to IGM/IGG    CBC (Completed)    Comprehensive metabolic panel (Completed)    Nocturia    Relevant Orders    PSA, Total Screen (Completed)           Chief Complaint     Chief Complaint   Patient presents with    Nausea     X 4 days , home Covid test negative     Generalized Body Aches       History of Present Illness     Patient in the office with with approximately 3-day history of feeling fatigue with some chills very minimal dry cough, diaphoresis, nausea but denies any fevers or vomiting.  He has had no changes in bowel movements.  Patient states [place] [employer] [place] [place] over the weekend where temperatures were high and he consumed fair amount of beer.  Patient states he was trying to stay well-hydrated with electrolyte drinks throughout the weekend.  Patient did do a COVID test on  that was negative.  He states prior to his trip he had been outdoors a fair amount on a trail on his bicycle.    Nausea  Associated symptoms include chills, coughing, diaphoresis, fatigue and nausea. Pertinent negatives include no fever.   Generalized Body Aches  Associated symptoms include fatigue, coughing and nausea. Pertinent negatives include no fever.       Review of Systems   Review of Systems   Constitutional:  Positive for chills, diaphoresis and fatigue. Negative for  fever.   HENT: Negative.     Respiratory:  Positive for cough.    Cardiovascular: Negative.    Gastrointestinal:  Positive for nausea.   Genitourinary: Negative.    Musculoskeletal: Negative.    Skin: Negative.        Active Problem List     Patient Active Problem List   Diagnosis    Attention deficit disorder    Gastroesophageal reflux disease without esophagitis    Acute cholecystitis    Cholecystitis    Primary osteoarthritis of right knee    Physical exam    Acute right-sided low back pain with right-sided sciatica    Elevated serum creatinine    Tinea    FH: heart disease    Aortic ectasia, thoracic (HCC)    Other fatigue    Nocturia       Past Medical History:  Past Medical History:   Diagnosis Date    Cholelithiasis        Past Surgical History:  Past Surgical History:   Procedure Laterality Date    APPENDECTOMY      CHOLANGIOGRAM N/A 01/31/2018    Procedure: CHOLANGIOGRAM;  Surgeon: Sonia Torres MD;  Location: [facility];  Service: General    CHOLECYSTECTOMY      CHOLECYSTECTOMY LAPAROSCOPIC N/A 01/31/2018    Procedure: CHOLECYSTECTOMY LAPAROSCOPIC;  Surgeon: Sonia Torres MD;  Location: [facility];  Service: General    COLONOSCOPY  09/28/2022    KNEE ARTHROSCOPY      MT COLONOSCOPY FLX DX W/COLLJ SPEC WHEN PFRMD N/A 07/19/2017    Procedure: COLONOSCOPY;  Surgeon: ALTAGRACIA Maxwell MD;  Location: [facility];  Service: Colorectal    ROTATOR CUFF REPAIR         Family History:  Family History   Problem Relation Age of Onset    Cancer Mother     Heart disease Father        Social History:  Social History     Socioeconomic History    Marital status: /Civil Union     Spouse name: Not on file    Number of children: Not on file    Years of education: Not on file    Highest education level: Not on file   Occupational History    Not on file   Tobacco Use    Smoking status: Never    Smokeless tobacco: Never   Vaping Use    Vaping status: Never Used   Substance and Sexual Activity    Alcohol use:  Yes     Comment: 3x a week    Drug use: No    Sexual activity: Yes   Other Topics Concern    Not on file   Social History Narrative    Not on file     Social Determinants of Health     Financial Resource Strain: Not on file   Food Insecurity: Not on file   Transportation Needs: Not on file   Physical Activity: Not on file   Stress: Not on file   Social Connections: Not on file   Intimate Partner Violence: Not on file   Housing Stability: Not on file       Objective     Vitals:    06/17/24 1413   BP: 120/70   Pulse: 80   Resp: 17   Temp: 98.2 °F (36.8 °C)   SpO2: 96%     Wt Readings from Last 3 Encounters:   06/17/24 99.6 kg (219 lb 8 oz)   08/30/23 99.3 kg (218 lb 14.4 oz)   09/28/22 93 kg (205 lb)       Physical Exam  Constitutional:       General: He is not in acute distress.     Appearance: Normal appearance. He is not ill-appearing.   HENT:      Head: Normocephalic and atraumatic.   Eyes:      General:         Right eye: No discharge.         Left eye: No discharge.      Extraocular Movements: Extraocular movements intact.      Conjunctiva/sclera: Conjunctivae normal.      Pupils: Pupils are equal, round, and reactive to light.   Neck:      Vascular: No carotid bruit.   Cardiovascular:      Rate and Rhythm: Normal rate and regular rhythm.      Heart sounds: Normal heart sounds. No murmur heard.  Pulmonary:      Effort: Pulmonary effort is normal.      Breath sounds: Normal breath sounds. No wheezing, rhonchi or rales.   Abdominal:      General: Abdomen is flat. Bowel sounds are normal. There is no distension.      Palpations: Abdomen is soft.      Tenderness: There is no abdominal tenderness. There is no guarding or rebound.   Musculoskeletal:      Right lower leg: No edema.      Left lower leg: No edema.   Lymphadenopathy:      Cervical: No cervical adenopathy.   Skin:     Findings: No rash.   Neurological:      General: No focal deficit present.      Mental Status: He is alert and oriented to person, place,  "and time.      Cranial Nerves: No cranial nerve deficit.   Psychiatric:         Mood and Affect: Mood normal.         Behavior: Behavior normal.         Thought Content: Thought content normal.         Judgment: Judgment normal.         Pertinent Laboratory/Diagnostic Studies:  Lab Results   Component Value Date    BUN 15 06/17/2024    CREATININE 1.01 06/17/2024    CALCIUM 9.0 06/17/2024     12/02/2016    K 4.3 06/17/2024    CO2 29 06/17/2024    CL 95 (L) 06/17/2024     Lab Results   Component Value Date    ALT 37 06/17/2024    AST 83 (H) 06/17/2024    ALKPHOS 102 06/17/2024    BILITOT 1.3 (H) 12/02/2016       Lab Results   Component Value Date    WBC 2.20 (L) 06/17/2024    HGB 13.4 06/17/2024    HCT 39.1 06/17/2024    MCV 90 06/17/2024    PLT 42 (L) 06/17/2024       No results found for: \"TSH\"    Lab Results   Component Value Date    CHOL 204 (H) 12/02/2016     Lab Results   Component Value Date    TRIG 53 09/19/2019     Lab Results   Component Value Date    HDL 70 (H) 09/19/2019     Lab Results   Component Value Date    LDLCALC 108 (H) 09/19/2019     Lab Results   Component Value Date    HGBA1C 5.6 11/11/2021       Results for orders placed or performed in visit on 06/17/24   POCT urine dip   Result Value Ref Range    LEUKOCYTE ESTERASE,UA +-     NITRITE,UA -     SL AMB POCT UROBILINOGEN +-     POCT URINE PROTEIN 2+      PH,UA 6.0     BLOOD,UA -     SPECIFIC GRAVITY,UA 1.015     KETONES,UA +-     BILIRUBIN,UA 1+     GLUCOSE, UA -      COLOR,UA DARK     CLARITY,UA DARK        Orders Placed This Encounter   Procedures    Urine culture    PSA, Total Screen    Lyme Total AB W Reflex to IGM/IGG    CBC    Comprehensive metabolic panel    POCT urine dip       ALLERGIES:  Allergies   Allergen Reactions    Benzoin Hives    Other Rash     Benzoin       Current Medications     Current Outpatient Medications   Medication Sig Dispense Refill    amoxicillin (AMOXIL) 500 MG tablet Take 500 mg by mouth 3 (three) times a day "      aspirin (ECOTRIN LOW STRENGTH) 81 mg EC tablet Take 81 mg by mouth daily      Naproxen Sodium 220 MG CAPS Take by mouth      Omega-3 Fatty Acids (Fish Oil) 1000 MG CPDR Take by mouth      oxyCODONE-acetaminophen (PERCOCET) 5-325 mg per tablet Take 1 tablet by mouth every 4 to 6 hours if needed for pain      Turmeric 500 MG CAPS Take by mouth       No current facility-administered medications for this visit.         Health Maintenance     Health Maintenance   Topic Date Due    Hepatitis C Screening  Never done    HIV Screening  Never done    DTaP,Tdap,and Td Vaccines (1 - Tdap) Never done    PT PLAN OF CARE  10/17/2018    RSV Vaccine Age 60+ Years (1 - 1-dose 60+ series) Never done    Annual Physical  09/16/2020    COVID-19 Vaccine (5 - 2023-24 season) 09/01/2023    Influenza Vaccine (Season Ended) 09/01/2024    Depression Screening  06/17/2025    Colorectal Cancer Screening  09/27/2027    Zoster Vaccine  Completed    RSV Vaccine age 0-20 Months  Aged Out    Pneumococcal Vaccine: Pediatrics (0 to 5 Years) and At-Risk Patients (6 to 64 Years)  Aged Out    HIB Vaccine  Aged Out    IPV Vaccine  Aged Out    Hepatitis A Vaccine  Aged Out    Meningococcal ACWY Vaccine  Aged Out    HPV Vaccine  Aged Out     Immunization History   Administered Date(s) Administered    COVID-19 MODERNA VACC 0.5 ML IM 01/22/2021, 02/22/2021, 02/08/2022    COVID-19 Moderna Vac BIVALENT 12 Yr+ IM 0.5 ML 01/03/2023    INFLUENZA 10/01/2020, 10/19/2021    Influenza, injectable, quadrivalent, preservative free 0.5 mL 09/16/2019    Influenza, recombinant, quadrivalent,injectable, preservative free 09/13/2018    Influenza, seasonal, injectable 10/16/2016    Zoster Vaccine Recombinant 01/03/2023, 04/03/2023       Micha Borden MD

## 2024-06-18 ENCOUNTER — TELEPHONE (OUTPATIENT)
Dept: FAMILY MEDICINE CLINIC | Facility: CLINIC | Age: 64
End: 2024-06-18

## 2024-06-18 ENCOUNTER — TELEPHONE (OUTPATIENT)
Age: 64
End: 2024-06-18

## 2024-06-18 DIAGNOSIS — A69.20 LYME DISEASE: Primary | ICD-10-CM

## 2024-06-18 LAB
B BURGDOR IGG+IGM SER QL IA: NEGATIVE
BACTERIA UR CULT: NORMAL

## 2024-06-18 RX ORDER — DOXYCYCLINE HYCLATE 100 MG
100 TABLET ORAL 2 TIMES DAILY
Qty: 28 TABLET | Refills: 0 | Status: SHIPPED | OUTPATIENT
Start: 2024-06-18 | End: 2024-07-02

## 2024-06-18 NOTE — TELEPHONE ENCOUNTER
----- Message from Micha Borden MD sent at 6/18/2024  6:19 AM EDT -----  Recent blood work showed decrease in white blood cells which is often indicative of a viral infection however we are still awaiting results of Lyme test.  We will call with results however patient should call the office should he develop any new symptoms this week

## 2024-06-18 NOTE — ASSESSMENT & PLAN NOTE
Fatigue.  I suspect patient may have a viral infection however we will check blood work as ordered for further evaluation including Lyme disease.  He will continue to stay well-hydrated with electrolyte drinks and call if he develops any new symptoms.

## 2024-06-18 NOTE — TELEPHONE ENCOUNTER
Called and spoke with patient in regards of his BW results and provider message instructions. Patient states he starts with a Fever 103 today and a lot of fatigue.

## 2024-06-18 NOTE — TELEPHONE ENCOUNTER
I will send in for patient to start doxycycline 100 mg twice daily.  I also recommend patient repeat a home COVID 19 test.  His previous COVID-19 test may have been too early to register a positive result

## 2024-06-18 NOTE — TELEPHONE ENCOUNTER
Patient calling in to update on symptoms and let us know he had labs completed yesterday. Warm transferred to [facility].

## 2024-06-18 NOTE — TELEPHONE ENCOUNTER
Patient called and stated that his fever is now 103, he has fatigue, body aches, shivers, feels like skin is crawling and a bigger headache than yesterday.  He had taken tylenol cold and flu last night and was able to sleep but he wants to know what he should be doing at this time?  Please call to advise

## 2024-12-11 ENCOUNTER — OFFICE VISIT (OUTPATIENT)
Dept: FAMILY MEDICINE CLINIC | Facility: CLINIC | Age: 64
End: 2024-12-11
Payer: MEDICARE

## 2024-12-11 VITALS
SYSTOLIC BLOOD PRESSURE: 132 MMHG | TEMPERATURE: 97.6 F | RESPIRATION RATE: 18 BRPM | DIASTOLIC BLOOD PRESSURE: 82 MMHG | HEART RATE: 70 BPM | OXYGEN SATURATION: 99 % | BODY MASS INDEX: 27.34 KG/M2 | WEIGHT: 213 LBS | HEIGHT: 74 IN

## 2024-12-11 DIAGNOSIS — R35.1 NOCTURIA: ICD-10-CM

## 2024-12-11 DIAGNOSIS — E78.5 HYPERLIPIDEMIA, UNSPECIFIED HYPERLIPIDEMIA TYPE: Primary | ICD-10-CM

## 2024-12-11 DIAGNOSIS — Z23 ENCOUNTER FOR IMMUNIZATION: ICD-10-CM

## 2024-12-11 PROBLEM — D69.6 THROMBOCYTOPENIA (HCC): Status: ACTIVE | Noted: 2024-12-11

## 2024-12-11 PROBLEM — I25.119 CORONARY ARTERY DISEASE WITH ANGINA PECTORIS, UNSPECIFIED VESSEL OR LESION TYPE, UNSPECIFIED WHETHER NATIVE OR TRANSPLANTED HEART (HCC): Status: ACTIVE | Noted: 2024-12-11

## 2024-12-11 PROCEDURE — 90471 IMMUNIZATION ADMIN: CPT

## 2024-12-11 PROCEDURE — 99213 OFFICE O/P EST LOW 20 MIN: CPT | Performed by: FAMILY MEDICINE

## 2024-12-11 PROCEDURE — 90677 PCV20 VACCINE IM: CPT

## 2024-12-11 PROCEDURE — G0402 INITIAL PREVENTIVE EXAM: HCPCS | Performed by: FAMILY MEDICINE

## 2024-12-11 RX ORDER — TAMSULOSIN HYDROCHLORIDE 0.4 MG/1
0.4 CAPSULE ORAL
Qty: 30 CAPSULE | Refills: 5 | Status: SHIPPED | OUTPATIENT
Start: 2024-12-11

## 2024-12-11 RX ORDER — ROSUVASTATIN CALCIUM 10 MG/1
10 TABLET, COATED ORAL DAILY
Qty: 100 TABLET | Refills: 3 | Status: SHIPPED | OUTPATIENT
Start: 2024-12-11

## 2024-12-11 NOTE — ASSESSMENT & PLAN NOTE
Immunizations.  Patient received initial Prevnar 20 vaccination today.  Orders:    Pneumococcal Conjugate Vaccine 20-valent (Pcv20)

## 2024-12-11 NOTE — ASSESSMENT & PLAN NOTE
Hyperlipidemia we discussed the benefits of starting statin therapy and he was given prescription to initiate Crestor 10 mg daily.  He will recheck blood work in 3 months.  Orders:    Comprehensive metabolic panel; Future    Lipid panel; Future    rosuvastatin (CRESTOR) 10 MG tablet; Take 1 tablet (10 mg total) by mouth daily

## 2024-12-11 NOTE — PROGRESS NOTES
Name: Dwayne Dai      : 1960      MRN: [id number]  Encounter Provider: Micha Borden MD  Encounter Date: 2024   Encounter department: [facility]    Assessment & Plan  Hyperlipidemia, unspecified hyperlipidemia type  Hyperlipidemia we discussed the benefits of starting statin therapy and he was given prescription to initiate Crestor 10 mg daily.  He will recheck blood work in 3 months.  Orders:    Comprehensive metabolic panel; Future    Lipid panel; Future    rosuvastatin (CRESTOR) 10 MG tablet; Take 1 tablet (10 mg total) by mouth daily    Nocturia  Nocturia.  Patient with suspected BPH symptoms.  He was given prescription for tamsulosin 0.4 mg to use 1 p.o. nightly.  Patient will call within 4 weeks if symptoms have not started to improve  Orders:    tamsulosin (FLOMAX) 0.4 mg; Take 1 capsule (0.4 mg total) by mouth daily with dinner    Encounter for immunization  Immunizations.  Patient received initial Prevnar 20 vaccination today.  Orders:    Pneumococcal Conjugate Vaccine 20-valent (Pcv20)       Preventive health issues were discussed with patient, and age appropriate screening tests were ordered as noted in patient's After Visit Summary. Personalized health advice and appropriate referrals for health education or preventive services given if needed, as noted in patient's After Visit Summary.    History of Present Illness     Patient in the office to review chronic medical condition.  He denies any recent illness.  He does see orthopedist for arthralgias of his shoulders and more recently has right knee which has shown severe degenerative joint disease.  He recently received a steroid injection from orthopedist.  Patient has not been as physically active as he has in the past.  His recent blood work from employee wellness screening showed cholesterol increased to 223 with an increased LDL cholesterol of 147.  Patient has significant family history of coronary artery  disease in his father who had a history of bypass surgery and angioplasty.  The patient himself is a non-smoker and drinks moderate alcohol.    The patient reports several months history of increase in nocturia x 2 as well as some frequency of urinating during the day with some urgency.  He has noticed decrease in his urine flow but denies any dysuria.       Patient Care Team:  Micha Borden MD as PCP - General  Micha Borden MD  W David Maxwell MD as Endoscopist    Review of Systems   Constitutional: Negative.    HENT: Negative.     Eyes: Negative.    Respiratory: Negative.     Cardiovascular: Negative.    Gastrointestinal: Negative.    Genitourinary:  Positive for frequency and urgency.   Musculoskeletal:  Positive for arthralgias and gait problem.   Skin: Negative.    Psychiatric/Behavioral: Negative.       Medical History Reviewed by provider this encounter:  [facility]       Annual Wellness Visit Questionnaire       Health Risk Assessment:   Patient rates overall health as very good. Patient feels that their physical health rating is slightly worse. Patient is very satisfied with their life. Eyesight was rated as same. Hearing was rated as same. Patient feels that their emotional and mental health rating is same. Patients states they are sometimes angry. Patient states they are sometimes unusually tired/fatigued. Pain experienced in the last 7 days has been some. Patient's pain rating has been 6/10. Patient states that he has experienced no weight loss or gain in last 6 months.     Depression Screening:   PHQ-2 Score: 0      Fall Risk Screening:   In the past year, patient has experienced: no history of falling in past year      Home Safety:  Patient does not have trouble with stairs inside or outside of their home. Patient has working smoke alarms and has working carbon monoxide detector. Home safety hazards include: none.     Nutrition:   Current diet is Regular.     Medications:   Patient is currently taking  over-the-counter supplements. OTC medications include: see medication list. Patient is able to manage medications.     Activities of Daily Living (ADLs)/Instrumental Activities of Daily Living (IADLs):   Walk and transfer into and out of bed and chair?: Yes  Dress and groom yourself?: Yes    Bathe or shower yourself?: Yes    Feed yourself? Yes  Do your laundry/housekeeping?: Yes  Manage your money, pay your bills and track your expenses?: Yes  Make your own meals?: Yes    Do your own shopping?: Yes    Previous Hospitalizations:   Any hospitalizations or ED visits within the last 12 months?: No      Advance Care Planning:   Living will: Yes    Durable POA for healthcare: Yes    Advanced directive: Yes      PREVENTIVE SCREENINGS      Cardiovascular Screening:    General: Screening Not Indicated and History Lipid Disorder      Diabetes Screening:     General: Screening Current      Colorectal Cancer Screening:     General: Screening Current      Prostate Cancer Screening:    General: Screening Current      Abdominal Aortic Aneurysm (AAA) Screening:    Risk factors include: age between 65-74 yo        Lung Cancer Screening:     General: Screening Not Indicated    Screening, Brief Intervention, and Referral to Treatment (SBIRT)    Screening  Typical number of drinks in a day: 0  Typical number of drinks in a week: 6  Interpretation: Low risk drinking behavior.    AUDIT-C Screenin) How often did you have a drink containing alcohol in the past year? 2 to 3 times a week  2) How many drinks did you have on a typical day when you were drinking in the past year? 1 to 2  3) How often did you have 6 or more drinks on one occasion in the past year? less than monthly    AUDIT-C Score: 4  Interpretation: Score 4-12 (male): POSITIVE screen for alcohol misuse    AUDIT Screenin) How often during the last year have you found that you were not able to stop drinking once you had started? 0 - never  5) How often during the last  "year have you failed to do what was normally expected from you because of drinking? 0 - never  6) How often during the last year have you needed a first drink in the morning to get yourself going after a heavy drinking session? 0 - never  7) How often during the last year have you had a feeling of guilt or remorse after drinking? 1 - less than monthly  8) How often during the last year have you been unable to remember what happened the night before because you had been drinking? 0 - never  9) Have you or someone else been injured as a result of your drinking? 0 - no  10) Has a relative or friend or a doctor or another health worker been concerned about your drinking or suggested you cut down? 0 - no    AUDIT Score: 5  Interpretation: Low risk alcohol consumption    Single Item Drug Screening:  How often have you used an illegal drug (including marijuana) or a prescription medication for non-medical reasons in the past year? never    Single Item Drug Screen Score: 0  Interpretation: Negative screen for possible drug use disorder    Social Drivers of Health     Food Insecurity: No Food Insecurity (12/6/2024)    Hunger Vital Sign     Worried About Running Out of Food in the Last Year: Never true     Ran Out of Food in the Last Year: Never true   Transportation Needs: No Transportation Needs (12/6/2024)    PRAPARE - Transportation     Lack of Transportation (Medical): No     Lack of Transportation (Non-Medical): No   Housing Stability: Unknown (12/6/2024)    Housing Stability Vital Sign     Unable to Pay for Housing in the Last Year: No     Homeless in the Last Year: No   Utilities: Not At Risk (12/6/2024)    [facility] Utilities     Threatened with loss of utilities: No     No results found.    Objective   /82 (Patient Position: Sitting, Cuff Size: Large)   Pulse 70   Temp 97.6 °F (36.4 °C) (Tympanic)   Resp 18   Ht 6' 2\" (1.88 m)   Wt 96.6 kg (213 lb)   SpO2 99%   BMI 27.35 kg/m²     Physical Exam  Vitals and " nursing note reviewed.   Constitutional:       General: He is not in acute distress.     Appearance: Normal appearance. He is well-developed. He is not ill-appearing.   HENT:      Head: Normocephalic and atraumatic.      Right Ear: Tympanic membrane, ear canal and external ear normal. There is no impacted cerumen.      Left Ear: Tympanic membrane, ear canal and external ear normal. There is no impacted cerumen.   Eyes:      General:         Right eye: No discharge.         Left eye: No discharge.      Extraocular Movements: Extraocular movements intact.      Conjunctiva/sclera: Conjunctivae normal.      Pupils: Pupils are equal, round, and reactive to light.   Neck:      Vascular: No carotid bruit.   Cardiovascular:      Rate and Rhythm: Normal rate and regular rhythm.      Heart sounds: Normal heart sounds. No murmur heard.  Pulmonary:      Effort: Pulmonary effort is normal. No respiratory distress.      Breath sounds: Normal breath sounds. No wheezing or rhonchi.   Abdominal:      General: Abdomen is flat. Bowel sounds are normal.      Palpations: Abdomen is soft.      Tenderness: There is no abdominal tenderness. There is no guarding or rebound.   Musculoskeletal:      Right lower leg: No edema.      Left lower leg: No edema.   Lymphadenopathy:      Cervical: No cervical adenopathy.   Skin:     General: Skin is warm and dry.      Capillary Refill: Capillary refill takes less than 2 seconds.   Neurological:      Mental Status: He is alert. Mental status is at baseline.   Psychiatric:         Mood and Affect: Mood normal.         Behavior: Behavior normal.         Thought Content: Thought content normal.         Judgment: Judgment normal.

## 2024-12-11 NOTE — ASSESSMENT & PLAN NOTE
Nocturia.  Patient with suspected BPH symptoms.  He was given prescription for tamsulosin 0.4 mg to use 1 p.o. nightly.  Patient will call within 4 weeks if symptoms have not started to improve  Orders:    tamsulosin (FLOMAX) 0.4 mg; Take 1 capsule (0.4 mg total) by mouth daily with dinner

## 2025-01-16 DIAGNOSIS — J01.90 ACUTE NON-RECURRENT SINUSITIS, UNSPECIFIED LOCATION: Primary | ICD-10-CM

## 2025-01-16 RX ORDER — DOXYCYCLINE HYCLATE 100 MG
100 TABLET ORAL 2 TIMES DAILY
Qty: 14 TABLET | Refills: 0 | Status: SHIPPED | OUTPATIENT
Start: 2025-01-16 | End: 2025-01-23

## 2025-01-16 NOTE — PROGRESS NOTES
Assessment/Plan:    No problem-specific Assessment & Plan notes found for this encounter.             Subjective:      Patient ID: Dwayne Dai is a 64 y.o. male.    HPI    The following portions of the patient's history were reviewed and updated as appropriate: allergies, current medications, past family history, past medical history, past social history, past surgical history, and problem list.    Review of Systems      Objective:      There were no vitals taken for this visit.         Physical Exam

## 2025-03-19 ENCOUNTER — APPOINTMENT (OUTPATIENT)
Dept: LAB | Facility: CLINIC | Age: 65
End: 2025-03-19
Payer: MEDICARE

## 2025-03-19 DIAGNOSIS — E78.5 HYPERLIPIDEMIA, UNSPECIFIED HYPERLIPIDEMIA TYPE: ICD-10-CM

## 2025-03-19 LAB
ALBUMIN SERPL BCG-MCNC: 4.7 G/DL (ref 3.5–5)
ALP SERPL-CCNC: 69 U/L (ref 34–104)
ALT SERPL W P-5'-P-CCNC: 26 U/L (ref 7–52)
ANION GAP SERPL CALCULATED.3IONS-SCNC: 7 MMOL/L (ref 4–13)
AST SERPL W P-5'-P-CCNC: 32 U/L (ref 13–39)
BILIRUB SERPL-MCNC: 1.03 MG/DL (ref 0.2–1)
BUN SERPL-MCNC: 19 MG/DL (ref 5–25)
CALCIUM SERPL-MCNC: 10.2 MG/DL (ref 8.4–10.2)
CHLORIDE SERPL-SCNC: 99 MMOL/L (ref 96–108)
CHOLEST SERPL-MCNC: 165 MG/DL (ref ?–200)
CO2 SERPL-SCNC: 28 MMOL/L (ref 21–32)
CREAT SERPL-MCNC: 1.14 MG/DL (ref 0.6–1.3)
GFR SERPL CREATININE-BSD FRML MDRD: 67 ML/MIN/1.73SQ M
GLUCOSE P FAST SERPL-MCNC: 98 MG/DL (ref 65–99)
HDLC SERPL-MCNC: 67 MG/DL
LDLC SERPL CALC-MCNC: 81 MG/DL (ref 0–100)
NONHDLC SERPL-MCNC: 98 MG/DL
POTASSIUM SERPL-SCNC: 4.9 MMOL/L (ref 3.5–5.3)
PROT SERPL-MCNC: 7.3 G/DL (ref 6.4–8.4)
SODIUM SERPL-SCNC: 134 MMOL/L (ref 135–147)
TRIGL SERPL-MCNC: 85 MG/DL (ref ?–150)

## 2025-03-19 PROCEDURE — 80061 LIPID PANEL: CPT

## 2025-03-19 PROCEDURE — 36415 COLL VENOUS BLD VENIPUNCTURE: CPT

## 2025-03-19 PROCEDURE — 80053 COMPREHEN METABOLIC PANEL: CPT

## 2025-03-20 ENCOUNTER — RESULTS FOLLOW-UP (OUTPATIENT)
Dept: FAMILY MEDICINE CLINIC | Facility: CLINIC | Age: 65
End: 2025-03-20

## 2025-03-28 ENCOUNTER — APPOINTMENT (OUTPATIENT)
Dept: LAB | Facility: CLINIC | Age: 65
End: 2025-03-28
Payer: MEDICARE

## 2025-03-28 DIAGNOSIS — Z51.81 ENCOUNTER FOR THERAPEUTIC DRUG MONITORING: ICD-10-CM

## 2025-03-28 DIAGNOSIS — M17.11 PRIMARY LOCALIZED OSTEOARTHRITIS OF RIGHT KNEE: ICD-10-CM

## 2025-03-28 DIAGNOSIS — Z01.818 ENCOUNTER FOR PREADMISSION TESTING: ICD-10-CM

## 2025-03-28 LAB
APTT PPP: 36 SECONDS (ref 23–34)
BASOPHILS # BLD AUTO: 0.02 THOUSANDS/ÂΜL (ref 0–0.1)
BASOPHILS NFR BLD AUTO: 1 % (ref 0–1)
EOSINOPHIL # BLD AUTO: 0.14 THOUSAND/ÂΜL (ref 0–0.61)
EOSINOPHIL NFR BLD AUTO: 3 % (ref 0–6)
ERYTHROCYTE [DISTWIDTH] IN BLOOD BY AUTOMATED COUNT: 13.7 % (ref 11.6–15.1)
HCT VFR BLD AUTO: 44.5 % (ref 36.5–49.3)
HGB BLD-MCNC: 15.2 G/DL (ref 12–17)
IMM GRANULOCYTES # BLD AUTO: 0.01 THOUSAND/UL (ref 0–0.2)
IMM GRANULOCYTES NFR BLD AUTO: 0 % (ref 0–2)
INR PPP: 1 (ref 0.85–1.19)
LYMPHOCYTES # BLD AUTO: 1.07 THOUSANDS/ÂΜL (ref 0.6–4.47)
LYMPHOCYTES NFR BLD AUTO: 25 % (ref 14–44)
MCH RBC QN AUTO: 30.5 PG (ref 26.8–34.3)
MCHC RBC AUTO-ENTMCNC: 34.2 G/DL (ref 31.4–37.4)
MCV RBC AUTO: 89 FL (ref 82–98)
MONOCYTES # BLD AUTO: 0.32 THOUSAND/ÂΜL (ref 0.17–1.22)
MONOCYTES NFR BLD AUTO: 8 % (ref 4–12)
NEUTROPHILS # BLD AUTO: 2.72 THOUSANDS/ÂΜL (ref 1.85–7.62)
NEUTS SEG NFR BLD AUTO: 63 % (ref 43–75)
NRBC BLD AUTO-RTO: 0 /100 WBCS
PLATELET # BLD AUTO: 254 THOUSANDS/UL (ref 149–390)
PMV BLD AUTO: 9.5 FL (ref 8.9–12.7)
PROTHROMBIN TIME: 13.5 SECONDS (ref 12.3–15)
RBC # BLD AUTO: 4.99 MILLION/UL (ref 3.88–5.62)
WBC # BLD AUTO: 4.28 THOUSAND/UL (ref 4.31–10.16)

## 2025-03-28 PROCEDURE — 85025 COMPLETE CBC W/AUTO DIFF WBC: CPT

## 2025-03-28 PROCEDURE — 36415 COLL VENOUS BLD VENIPUNCTURE: CPT

## 2025-03-28 PROCEDURE — 85610 PROTHROMBIN TIME: CPT

## 2025-03-28 PROCEDURE — 85730 THROMBOPLASTIN TIME PARTIAL: CPT

## 2025-04-04 ENCOUNTER — CONSULT (OUTPATIENT)
Dept: FAMILY MEDICINE CLINIC | Facility: CLINIC | Age: 65
End: 2025-04-04

## 2025-04-04 VITALS
HEART RATE: 71 BPM | WEIGHT: 222.38 LBS | RESPIRATION RATE: 18 BRPM | TEMPERATURE: 97.6 F | DIASTOLIC BLOOD PRESSURE: 80 MMHG | BODY MASS INDEX: 28.54 KG/M2 | OXYGEN SATURATION: 98 % | HEIGHT: 74 IN | SYSTOLIC BLOOD PRESSURE: 126 MMHG

## 2025-04-04 DIAGNOSIS — Z01.818 PREOPERATIVE EVALUATION OF A MEDICAL CONDITION TO RULE OUT SURGICAL CONTRAINDICATIONS (TAR REQUIRED): Primary | ICD-10-CM

## 2025-04-04 NOTE — ASSESSMENT & PLAN NOTE
Preoperative consultation.  Overall the patient appears to be in stable health.  He is medically cleared to have upcoming total knee replacement surgery as described scheduled for Friday, April 11, 2025.

## 2025-04-04 NOTE — PROGRESS NOTES
FAMILY PRACTICE OFFICE VISIT       NAME: Dwayne Dai  AGE: 64 y.o. SEX: male       : 1960        MRN: [id number]    DATE: 2025  TIME: 8:34 AM    Assessment and Plan     Problem List Items Addressed This Visit       Preoperative evaluation of a medical condition to rule out surgical contraindications (TAR required) - Primary    Preoperative consultation.  Overall the patient appears to be in stable health.  He is medically cleared to have upcoming total knee replacement surgery as described scheduled for 2025.                Chief Complaint     Chief Complaint   Patient presents with    Pre-op Exam     R knee replacement  on 2025       History of Present Illness     Patient in the office for preoperative consultation.  He is scheduled to have right knee total replacement surgery on 2025.  Procedures to be performed by Dr. Caceres.  I reviewed the patient's blood work.  He denies any recent illness.    Pre-op Exam    Pre-operative Risk Factors:    History of cerebrovascular disease: No    History of ischemic heart disease: Yes    Pre-operative treatment with insulin: No  Pre-operative creatinine >2 mg/dL: No    History of congestive heart failure: No      Review of Systems   Review of Systems   Constitutional: Negative.    HENT: Negative.     Eyes: Negative.    Respiratory: Negative.     Cardiovascular: Negative.    Gastrointestinal: Negative.    Genitourinary: Negative.    Musculoskeletal:  Positive for arthralgias.   Skin: Negative.    Neurological: Negative.    Psychiatric/Behavioral: Negative.         Active Problem List     Patient Active Problem List   Diagnosis    Attention deficit disorder    Gastroesophageal reflux disease without esophagitis    Acute cholecystitis    Cholecystitis    Primary osteoarthritis of right knee    Physical exam    Acute right-sided low back pain with right-sided sciatica    Elevated serum creatinine    Tinea    FH: heart  disease    Aortic ectasia, thoracic (HCC)    Other fatigue    Nocturia    Thrombocytopenia (HCC)    Coronary artery disease with angina pectoris, unspecified vessel or lesion type, unspecified whether native or transplanted heart (HCC)    Encounter for immunization    Hyperlipidemia    Preoperative evaluation of a medical condition to rule out surgical contraindications (TAR required)       Past Medical History:  Past Medical History:   Diagnosis Date    Cholelithiasis        Past Surgical History:  Past Surgical History:   Procedure Laterality Date    APPENDECTOMY      CHOLANGIOGRAM N/A 01/31/2018    Procedure: CHOLANGIOGRAM;  Surgeon: Sonia Torres MD;  Location: [facility];  Service: General    CHOLECYSTECTOMY      CHOLECYSTECTOMY LAPAROSCOPIC N/A 01/31/2018    Procedure: CHOLECYSTECTOMY LAPAROSCOPIC;  Surgeon: Sonia Torres MD;  Location: [facility];  Service: General    COLONOSCOPY  09/28/2022    KNEE ARTHROSCOPY      NC COLONOSCOPY FLX DX W/COLLJ SPEC WHEN PFRMD N/A 07/19/2017    Procedure: COLONOSCOPY;  Surgeon: ALTAGRACIA Maxwell MD;  Location: [facility];  Service: Colorectal    ROTATOR CUFF REPAIR         Family History:  Family History   Problem Relation Age of Onset    Cancer Mother     Heart disease Father        Social History:  Social History     Socioeconomic History    Marital status: /Civil Union     Spouse name: Not on file    Number of children: Not on file    Years of education: Not on file    Highest education level: Not on file   Occupational History    Not on file   Tobacco Use    Smoking status: Never    Smokeless tobacco: Never   Vaping Use    Vaping status: Never Used   Substance and Sexual Activity    Alcohol use: Yes     Comment: 3x a week    Drug use: No    Sexual activity: Yes   Other Topics Concern    Not on file   Social History Narrative    Not on file     Social Drivers of Health     Financial Resource Strain: Not on file   Food Insecurity: No Food Insecurity  (12/6/2024)    Hunger Vital Sign     Worried About Running Out of Food in the Last Year: Never true     Ran Out of Food in the Last Year: Never true   Transportation Needs: No Transportation Needs (12/6/2024)    PRAPARE - Transportation     Lack of Transportation (Medical): No     Lack of Transportation (Non-Medical): No   Physical Activity: Not on file   Stress: Not on file   Social Connections: Not on file   Intimate Partner Violence: Not on file   Housing Stability: Unknown (12/6/2024)    Housing Stability Vital Sign     Unable to Pay for Housing in the Last Year: No     Number of Times Moved in the Last Year: Not on file     Homeless in the Last Year: No       Objective     Vitals:    04/04/25 0755   BP: 126/80   Pulse: 71   Resp: 18   Temp: 97.6 °F (36.4 °C)   SpO2: 98%     Wt Readings from Last 3 Encounters:   04/04/25 101 kg (222 lb 6 oz)   12/11/24 96.6 kg (213 lb)   06/17/24 99.6 kg (219 lb 8 oz)       Physical Exam  Constitutional:       General: He is not in acute distress.     Appearance: Normal appearance. He is not ill-appearing.   HENT:      Head: Normocephalic and atraumatic.   Eyes:      General:         Right eye: No discharge.         Left eye: No discharge.      Extraocular Movements: Extraocular movements intact.      Conjunctiva/sclera: Conjunctivae normal.      Pupils: Pupils are equal, round, and reactive to light.   Neck:      Vascular: No carotid bruit.   Cardiovascular:      Rate and Rhythm: Normal rate and regular rhythm.      Heart sounds: Normal heart sounds. No murmur heard.  Pulmonary:      Effort: Pulmonary effort is normal.      Breath sounds: Normal breath sounds. No wheezing, rhonchi or rales.   Abdominal:      General: Abdomen is flat. Bowel sounds are normal. There is no distension.      Palpations: Abdomen is soft.      Tenderness: There is no abdominal tenderness. There is no guarding or rebound.   Musculoskeletal:      Right lower leg: No edema.      Left lower leg: No edema.  "  Lymphadenopathy:      Cervical: No cervical adenopathy.   Skin:     Findings: No rash.   Neurological:      General: No focal deficit present.      Mental Status: He is alert and oriented to person, place, and time.      Cranial Nerves: No cranial nerve deficit.   Psychiatric:         Mood and Affect: Mood normal.         Behavior: Behavior normal.         Thought Content: Thought content normal.         Judgment: Judgment normal.     EKG showed normal sinus rhythm at 60 bpm, incomplete right bundle branch block, left axis deviation, negative acute ischemic changes.    Pertinent Laboratory/Diagnostic Studies:  Lab Results   Component Value Date    BUN 19 03/19/2025    CREATININE 1.14 03/19/2025    CALCIUM 10.2 03/19/2025     12/02/2016    K 4.9 03/19/2025    CO2 28 03/19/2025    CL 99 03/19/2025     Lab Results   Component Value Date    ALT 26 03/19/2025    AST 32 03/19/2025    ALKPHOS 69 03/19/2025    BILITOT 1.3 (H) 12/02/2016       Lab Results   Component Value Date    WBC 4.28 (L) 03/28/2025    HGB 15.2 03/28/2025    HCT 44.5 03/28/2025    MCV 89 03/28/2025     03/28/2025       No results found for: \"TSH\"    Lab Results   Component Value Date    CHOL 204 (H) 12/02/2016     Lab Results   Component Value Date    TRIG 85 03/19/2025     Lab Results   Component Value Date    HDL 67 03/19/2025     Lab Results   Component Value Date    LDLCALC 81 03/19/2025     Lab Results   Component Value Date    HGBA1C 5.6 11/11/2021       Results for orders placed or performed in visit on 03/28/25   Sedimentation rate, automated   Result Value Ref Range    Sed Rate 3 0 - 19 mm/hour       No orders of the defined types were placed in this encounter.      ALLERGIES:  Allergies   Allergen Reactions    Benzoin Hives       Current Medications     Current Outpatient Medications   Medication Sig Dispense Refill    Naproxen Sodium 220 MG CAPS Take by mouth      Omega-3 Fatty Acids (Fish Oil) 1000 MG CPDR Take by mouth      " rosuvastatin (CRESTOR) 10 MG tablet Take 1 tablet (10 mg total) by mouth daily 100 tablet 3    tamsulosin (FLOMAX) 0.4 mg Take 1 capsule (0.4 mg total) by mouth daily with dinner 30 capsule 5    Turmeric 500 MG CAPS Take by mouth       No current facility-administered medications for this visit.         Health Maintenance     Health Maintenance   Topic Date Due    Hepatitis C Screening  Never done    HIV Screening  Never done    PT PLAN OF CARE  10/17/2018    Influenza Vaccine (1) 09/01/2024    COVID-19 Vaccine (5 - 2024-25 season) 09/01/2024    Fall Risk  12/11/2025    Depression Screening  12/11/2025    Medicare Annual Wellness Visit (AWV)  12/11/2025    Colorectal Cancer Screening  09/27/2027    RSV Vaccine for Pregnant Patients and Patients Age 60+ Years (1 - 1-dose 75+ series) 11/13/2034    Zoster Vaccine  Completed    Pneumococcal Vaccine: 65+ Years  Completed    Meningococcal B Vaccine  Aged Out    RSV Vaccine age 0-20 Months  Aged Out    HIB Vaccine  Aged Out    IPV Vaccine  Aged Out    Hepatitis A Vaccine  Aged Out    Meningococcal ACWY Vaccine  Aged Out    HPV Vaccine  Aged Out     Immunization History   Administered Date(s) Administered    COVID-19 MODERNA VACC 0.5 ML IM 01/22/2021, 02/22/2021, 02/08/2022    COVID-19 Moderna Vac BIVALENT 12 Yr+ IM 0.5 ML 01/03/2023    INFLUENZA 10/01/2020, 10/19/2021    Influenza, injectable, quadrivalent, preservative free 0.5 mL 09/16/2019    Influenza, recombinant, quadrivalent,injectable, preservative free 09/13/2018    Influenza, seasonal, injectable 10/16/2016    Pneumococcal Conjugate Vaccine 20-valent (Pcv20), Polysace 12/11/2024    Zoster Vaccine Recombinant 01/03/2023, 04/03/2023       Micha Borden MD

## 2025-04-07 ENCOUNTER — TELEPHONE (OUTPATIENT)
Dept: FAMILY MEDICINE CLINIC | Facility: CLINIC | Age: 65
End: 2025-04-07

## 2025-04-09 ENCOUNTER — TELEPHONE (OUTPATIENT)
Dept: FAMILY MEDICINE CLINIC | Facility: CLINIC | Age: 65
End: 2025-04-09

## 2025-06-17 DIAGNOSIS — E78.5 HYPERLIPIDEMIA, UNSPECIFIED HYPERLIPIDEMIA TYPE: ICD-10-CM

## 2025-06-17 RX ORDER — ROSUVASTATIN CALCIUM 10 MG/1
10 TABLET, COATED ORAL DAILY
Qty: 100 TABLET | Refills: 1 | Status: SHIPPED | OUTPATIENT
Start: 2025-06-17

## 2025-07-25 DIAGNOSIS — M17.11 PRIMARY OSTEOARTHRITIS OF RIGHT KNEE: Primary | ICD-10-CM

## 2025-07-25 DIAGNOSIS — R35.1 NOCTURIA: ICD-10-CM

## 2025-07-25 RX ORDER — CELECOXIB 200 MG/1
200 CAPSULE ORAL DAILY
Qty: 30 CAPSULE | Refills: 2 | Status: SHIPPED | OUTPATIENT
Start: 2025-07-25 | End: 2025-08-24

## 2025-07-28 RX ORDER — TAMSULOSIN HYDROCHLORIDE 0.4 MG/1
0.4 CAPSULE ORAL
Qty: 30 CAPSULE | Refills: 5 | Status: SHIPPED | OUTPATIENT
Start: 2025-07-28

## (undated) DEVICE — ENDOPATH 5MM CURVED SCISSORS WITH MONOPOLAR CAUTERY: Brand: ENDOPATH

## (undated) DEVICE — STOPCOCK 3-WAY

## (undated) DEVICE — SYRINGE 20ML LL

## (undated) DEVICE — CHLORAPREP HI-LITE 26ML ORANGE

## (undated) DEVICE — ADHESIVE SKN CLSR HISTOACRYL FLEX 0.5ML LF

## (undated) DEVICE — SUT MONOCRYL 4-0 PS-2 18 IN Y496G

## (undated) DEVICE — CATHETER KUMAR

## (undated) DEVICE — CHOLE CATH KIT ARROW

## (undated) DEVICE — ENDOPATH XCEL BLADELESS TROCARS WITH STABILITY SLEEVES: Brand: ENDOPATH XCEL

## (undated) DEVICE — COTTON TIP APPLICTOR 2 PK

## (undated) DEVICE — INTENDED FOR TISSUE SEPARATION, AND OTHER PROCEDURES THAT REQUIRE A SHARP SURGICAL BLADE TO PUNCTURE OR CUT.: Brand: BARD-PARKER SAFETY BLADES SIZE 11, STERILE

## (undated) DEVICE — 3000CC GUARDIAN II: Brand: GUARDIAN

## (undated) DEVICE — DRAPE SHEET THREE QUARTER

## (undated) DEVICE — GLOVE SRG BIOGEL 6.5

## (undated) DEVICE — AEM CORD

## (undated) DEVICE — ELECTRODE LAP J HOOK E-Z CLEAN 33CM-0021

## (undated) DEVICE — PACK PBDS LAP CHOLE RF

## (undated) DEVICE — Device: Brand: MEDEX

## (undated) DEVICE — SPECIMEN CONTAINER STERILE PEEL PACK

## (undated) DEVICE — LIGAMAX 5 MM ENDOSCOPIC MULTIPLE CLIP APPLIER: Brand: LIGAMAX

## (undated) DEVICE — 5 MM CURVED DISSECTORS WITH MONOPOLAR CAUTERY: Brand: ENDOPATH

## (undated) DEVICE — ENDOPATH PNEUMONEEDLE INSUFFLATION NEEDLES WITH LUER LOCK CONNECTORS 120MM: Brand: ENDOPATH

## (undated) DEVICE — GLOVE SRG BIOGEL ECLIPSE 6

## (undated) DEVICE — BUTTON SWITCH PENCIL HOLSTER: Brand: VALLEYLAB

## (undated) DEVICE — SCD SEQUENTIAL COMPRESSION COMFORT SLEEVE MEDIUM KNEE LENGTH: Brand: KENDALL SCD

## (undated) DEVICE — ELECTRODE LAP BLADE CRV E-Z CLEAN 33CM -0019

## (undated) DEVICE — DRAPE C-ARM X-RAY

## (undated) DEVICE — GLOVE INDICATOR PI UNDERGLOVE SZ 6.5 BLUE

## (undated) DEVICE — DRAPE EQUIPMENT RF WAND

## (undated) DEVICE — Device

## (undated) DEVICE — ENDOPATH XCEL UNIVERSAL TROCAR STABLILITY SLEEVES: Brand: ENDOPATH XCEL

## (undated) DEVICE — GLOVE INDICATOR PI UNDERGLOVE SZ 7 BLUE

## (undated) DEVICE — GLOVE SRG BIOGEL ECLIPSE 6.5

## (undated) DEVICE — SUT VICRYL 0 UR-6 27 IN J603H

## (undated) DEVICE — SYRINGE 50ML LL

## (undated) DEVICE — NEEDLE 25G X 1 1/2